# Patient Record
Sex: MALE | Race: BLACK OR AFRICAN AMERICAN | NOT HISPANIC OR LATINO | Employment: UNEMPLOYED | URBAN - METROPOLITAN AREA
[De-identification: names, ages, dates, MRNs, and addresses within clinical notes are randomized per-mention and may not be internally consistent; named-entity substitution may affect disease eponyms.]

---

## 2017-06-26 ENCOUNTER — GENERIC CONVERSION - ENCOUNTER (OUTPATIENT)
Dept: OTHER | Facility: OTHER | Age: 57
End: 2017-06-26

## 2017-08-10 ENCOUNTER — ALLSCRIPTS OFFICE VISIT (OUTPATIENT)
Dept: OTHER | Facility: OTHER | Age: 57
End: 2017-08-10

## 2017-08-10 ENCOUNTER — HOSPITAL ENCOUNTER (OUTPATIENT)
Dept: RADIOLOGY | Facility: HOSPITAL | Age: 57
Discharge: HOME | End: 2017-08-10
Attending: ORTHOPAEDIC SURGERY
Payer: COMMERCIAL

## 2017-08-10 DIAGNOSIS — M25.511 PAIN IN RIGHT SHOULDER: ICD-10-CM

## 2017-08-10 PROCEDURE — 73030 X-RAY EXAM OF SHOULDER: CPT

## 2017-08-18 ENCOUNTER — GENERIC CONVERSION - ENCOUNTER (OUTPATIENT)
Dept: OTHER | Facility: OTHER | Age: 57
End: 2017-08-18

## 2017-08-18 ENCOUNTER — APPOINTMENT (OUTPATIENT)
Dept: PHYSICAL THERAPY | Facility: CLINIC | Age: 57
End: 2017-08-18
Payer: COMMERCIAL

## 2017-08-18 DIAGNOSIS — M25.511 PAIN IN RIGHT SHOULDER: ICD-10-CM

## 2017-08-18 PROCEDURE — 97012 MECHANICAL TRACTION THERAPY: CPT

## 2017-08-18 PROCEDURE — 97162 PT EVAL MOD COMPLEX 30 MIN: CPT

## 2017-08-18 PROCEDURE — 97110 THERAPEUTIC EXERCISES: CPT

## 2017-08-22 ENCOUNTER — APPOINTMENT (OUTPATIENT)
Dept: PHYSICAL THERAPY | Facility: CLINIC | Age: 57
End: 2017-08-22
Payer: COMMERCIAL

## 2017-08-25 ENCOUNTER — APPOINTMENT (OUTPATIENT)
Dept: PHYSICAL THERAPY | Facility: CLINIC | Age: 57
End: 2017-08-25
Payer: COMMERCIAL

## 2017-08-28 ENCOUNTER — APPOINTMENT (OUTPATIENT)
Dept: PHYSICAL THERAPY | Facility: CLINIC | Age: 57
End: 2017-08-28
Payer: COMMERCIAL

## 2017-08-29 ENCOUNTER — GENERIC CONVERSION - ENCOUNTER (OUTPATIENT)
Dept: OTHER | Facility: OTHER | Age: 57
End: 2017-08-29

## 2017-08-30 ENCOUNTER — APPOINTMENT (OUTPATIENT)
Dept: PHYSICAL THERAPY | Facility: CLINIC | Age: 57
End: 2017-08-30
Payer: COMMERCIAL

## 2018-01-09 NOTE — MISCELLANEOUS
Message   Recorded as Task   Date: 08/17/2016 07:36 AM, Created By: Leonel Santiago   Task Name: Follow Up   Assigned To: [facility] ,Team   Regarding Patient: Shannon Cruz, Status: Active   Comment:    Gail Foy - 17 Aug 2016 7:36 AM     TASK CREATED  Pt is S/P (R) HIP INTRA ARTICULAR STEROID INJ o n 8/9/16 by Dr Simi Pennington  No pain diary scanned in  No f/u scheduled   Marleen Otero - 17 Aug 2016 1:04 PM     TASK EDITED  1st attempt- left message on voice mail for followup after injection  [place] - 27 Aug 2016 9:02 AM     TASK EDITED   2nd attemptto call, no answer  LMOM for CB  [place] - 24 Aug 2016 9:03 AM     TASK EDITED    Please send a [facility] letter  letter sent      Active Problems    1  _ (780 6)   2  Abdominal pain, LLQ (left lower quadrant) (789 04) (R10 32)   3  Abdominal pain, RLQ (right lower quadrant) (789 03) (R10 31)   4  Acute bronchitis (466 0) (J20 9)   5  Acute pharyngitis (462) (J02 9)   6  Cervical disc disorder with radiculopathy, mid-cervical region (723 4) (M50 12)   7  Cervical radiculopathy (723 4) (M54 12)   8  Cough (786 2) (R05)   9  Degenerative lumbar disc (722 52) (M51 36)   10  Depression (311) (F32 9)   11  Dyspepsia (536 8) (K30)   12  Dysuria (788 1) (R30 0)   13  Encounter for screening colonoscopy (V76 51) (Z12 11)   14  Hematuria (599 70) (R31 9)   15  Herniated cervical disc (722 0) (M50 20)   16  Hip pain, right (719 45) (M25 551)   17  Denied: History of HIV positive, asymptomatic   18  Lumbar herniated disc (722 10) (M51 26)   19  Lumbar radiculopathy (724 4) (M54 16)   20  Multilevel degenerative disc disease (722 6) (M53 9)   21  Myofascial pain (729 1) (M79 1)   22  Restless legs syndrome (333 94) (G25 81)   23  Well adult on routine health check (V70 0) (Z00 00)    Current Meds   1  Citalopram Hydrobromide TABS; Therapy: (Recorded:16Apr2015) to Recorded   2  ClonazePAM 1 MG Oral Tablet;    Therapy: 02Apr2015 to (Segundo Mixon) Recorded   3  Hydrocodone-Acetaminophen CAPS; Therapy: (Recorded:85Cgd7187) to Recorded   4  KlonoPIN TABS (ClonazePAM); Therapy: (Recorded:16Apr2015) to Recorded   5  Lyrica 100 MG Oral Capsule; TAKE 1 CAPSULE TWICE DAILY; Therapy: 51QNP5302 to (Evaluate:02Oct2016); Last Rx:38Hua6466 Ordered   6  Naproxen TABS; Therapy: (Recorded:16Apr2015) to Recorded   7  Pantoprazole Sodium 40 MG Oral Tablet Delayed Release; TAKE 1 TABLET 30   MINUTES BEFORE BREAKFAST DAILY; Therapy: 97LTH9765 to (Evaluate:14Aao6219)  Requested for: 55YAV2707; Last   Rx:18Mar2016 Ordered   8  Simvastatin 40 MG Oral Tablet; Therapy: 20Apr2015 to (Evaluate:44Bao3041) Recorded   9  Tamsulosin HCl - 0 4 MG Oral Capsule; TAKE 1 CAPSULE BY MOUTH DAILY AT   BEDTIME; Therapy: 24AYX2262 to (Jac Farley)  Requested for: 75CSI3570; Last   SB:62ZLC7025 Ordered   10  TiZANidine HCl - 4 MG Oral Tablet; TAKE 1 TABLET AT BEDTIME; Therapy: 34DMW0982 to (Evaluate:25Mar2016)  Requested for: 12EAI6516; Last    Rx:25Jan2016 Ordered    Allergies    1   ASPIRIN    Signatures   Electronically signed by : Rosina Herrera, ; Aug 25 2016 11:59AM EST                       (Author)

## 2018-01-09 NOTE — MISCELLANEOUS
Message   Recorded as Task   Date: 03/15/2016 07:34 AM, Created By: Alfredo Miranda   Task Name: Follow Up   Assigned To: [address] ,Team   Regarding Patient: Ethel Sorto, Status: Active   Comment:    Gail Foy - 15 Mar 2016 7:34 AM     TASK CREATED  Pt is S/P B/L L5 TFESI on 3/8/16 by Dr Alyse Denise  No f/u scheduled   Gail Foy - 15 Mar 2016 10:28 AM     TASK EDITED  1st attempt to reach pt  LM for return call  Gail Foy - 17 Mar 2016 12:42 PM     TASK EDITED  2nd attempt to reach pt  LM for return call  Gail Foy - 22 Mar 2016 10:00 AM     TASK EDITED  Please send a can't reach you letter  Can't reach you letter sent to patient  Active Problems    1  _ (780 6)   2  Abdominal pain, LLQ (left lower quadrant) (789 04) (R10 32)   3  Abdominal pain, RLQ (right lower quadrant) (789 03) (R10 31)   4  Acute bronchitis (466 0) (J20 9)   5  Acute pharyngitis (462) (J02 9)   6  Bilateral hip pain (719 45) (M25 551,M25 552)   7  Cervical disc disorder with radiculopathy, mid-cervical region (723 4) (M50 12)   8  Cervical radiculopathy (723 4) (M54 12)   9  Cough (786 2) (R05)   10  Degenerative lumbar disc (722 52) (M51 36)   11  Depression (311) (F32 9)   12  Dyspepsia (536 8) (K30)   13  Dysuria (788 1) (R30 0)   14  Encounter for screening colonoscopy (V76 51) (Z12 11)   15  Hematuria (599 70) (R31 9)   16  Herniated cervical disc (722 0) (M50 20)   17  Denied: History of HIV positive, asymptomatic   18  Lumbar herniated disc (722 10) (M51 26)   19  Lumbar radiculopathy (724 4) (M54 16)   20  Multilevel degenerative disc disease (722 6) (M53 9)   21  Myofascial pain (729 1) (M79 1)   22  Restless legs syndrome (333 94) (G25 81)   23  Well adult on routine health check (V70 0) (Z00 00)    Current Meds   1  Citalopram Hydrobromide TABS; Therapy: (Recorded:16Apr2015) to Recorded   2  ClonazePAM 1 MG Oral Tablet; Therapy: 02Apr2015 to ([phone]) Recorded   3   Flexeril TABS (Cyclobenzaprine HCl); Therapy: (Recorded:68Ori0362) to Recorded   4  Hydrocodone-Acetaminophen CAPS; Therapy: (Recorded:16Apr2015) to Recorded   5  KlonoPIN TABS (ClonazePAM); Therapy: (Recorded:69Awp6988) to Recorded   6  Naproxen TABS; Therapy: (Recorded:16Apr2015) to Recorded   7  Pantoprazole Sodium 40 MG Oral Tablet Delayed Release; TAKE 1 TABLET 30   MINUTES BEFORE BREAKFAST DAILY; Therapy: 91JIN3585 to (Evaluate:24Mdq4926)  Requested for: 87HIW0669; Last   Rx:18Mar2016 Ordered   8  Simvastatin 40 MG Oral Tablet; Therapy: 20Apr2015 to (Evaluate:36Qms7255) Recorded   9  Tamsulosin HCl - 0 4 MG Oral Capsule; TAKE 1 CAPSULE Bedtime; Therapy: 77CRF2858 to (Last Rx:09Nov2015)  Requested for: 10LDS6070 Ordered   10  TiZANidine HCl - 4 MG Oral Tablet; TAKE 1 TABLET AT BEDTIME; Therapy: 24TOH4714 to (Evaluate:25Mar2016)  Requested for: 70FHT0511; Last    Rx:25Jan2016 Ordered    Allergies    1   ASPIRIN    Signatures   Electronically signed by : Herson Kohli, ; Mar 24 2016 11:21AM EST                       (Author)

## 2018-01-12 VITALS — DIASTOLIC BLOOD PRESSURE: 77 MMHG | WEIGHT: 162 LBS | SYSTOLIC BLOOD PRESSURE: 116 MMHG | HEART RATE: 73 BPM

## 2018-01-14 NOTE — MISCELLANEOUS
Message   Recorded as Task   Date: 01/28/2016 07:34 AM, Created By: [facility]   Task Name: Med Renewal Request   Assigned To: [facility],Team   Regarding Patient: Minoo Contreras, Status: Active   Comment:    Celena Means - 28 Jan 2016 7:34 AM     TASK CREATED  can you please contact patient and let him know his hip xray was normal  thanks   Gail Foy - 29 Jan 2016 3:42 PM     TASK EDITED  Spoke with pt and made him aware of normal results of hip xray  Active Problems    1  _ (780 6)   2  Abdominal pain, LLQ (left lower quadrant) (789 04) (R10 32)   3  Abdominal pain, RLQ (right lower quadrant) (789 03) (R10 31)   4  Acute bronchitis (466 0) (J20 9)   5  Acute pharyngitis (462) (J02 9)   6  Bilateral hip pain (719 45) (M25 551,M25 552)   7  Cervical radiculopathy (723 4) (M54 12)   8  Cough (786 2) (R05)   9  Degenerative lumbar disc (722 52) (M51 36)   10  Depression (311) (F32 9)   11  Dyspepsia (536 8) (K30)   12  Dysuria (788 1) (R30 0)   13  Encounter for screening colonoscopy (V76 51) (Z12 11)   14  Hematuria (599 70) (R31 9)   15  Herniated cervical disc (722 0) (M50 20)   16  Denied: History of HIV positive, asymptomatic   17  Lumbar herniated disc (722 10) (M51 26)   18  Lumbar radiculopathy (724 4) (M54 16)   19  Multilevel degenerative disc disease (722 6) (M53 9)   20  Myofascial pain (729 1) (M79 1)   21  Restless legs syndrome (333 94) (G25 81)   22  Well adult on routine health check (V70 0) (Z00 00)    Current Meds   1  Citalopram Hydrobromide TABS; Therapy: (Recorded:03Cgt4407) to Recorded   2  ClonazePAM 1 MG Oral Tablet; Therapy: 57Seh3599 to (Jose Angel Marino) Recorded   3  Flexeril TABS (Cyclobenzaprine HCl); Therapy: (Recorded:16Apr2015) to Recorded   4  Hydrocodone-Acetaminophen CAPS; Therapy: (Recorded:16Apr2015) to Recorded   5  KlonoPIN TABS (ClonazePAM); Therapy: (Recorded:16Apr2015) to Recorded   6  Naproxen TABS;    Therapy: (Recorded:43Pau4087) to Recorded   7  Pantoprazole Sodium 40 MG Oral Tablet Delayed Release; TAKE 1 TABLET 30   MINUTES BEFORE BREAKFAST DAILY; Therapy: 05KRA9722 to ([place])  Requested for: 81NRW1924; Last   Rx:51Ivh8913 Ordered   8  Simvastatin 40 MG Oral Tablet; Therapy: 20Apr2015 to (Evaluate:35Uwk8044) Recorded   9  Tamsulosin HCl - 0 4 MG Oral Capsule; TAKE 1 CAPSULE Bedtime; Therapy: 89TKG1123 to (Last Rx:09Nov2015)  Requested for: 38LIF1017 Ordered   10  TiZANidine HCl - 4 MG Oral Tablet; TAKE 1 TABLET AT BEDTIME; Therapy: 93BBI8210 to (Evaluate:25Mar2016)  Requested for: 51FIQ4181; Last    Rx:25Jan2016 Ordered    Allergies    1   ASPIRIN    Signatures   Electronically signed by : Lang Schirmer, ; Feb 1 2016  9:06AM EST                       (Author)

## 2018-01-16 NOTE — RESULT NOTES
Message   Recorded as Task   Date: 02/16/2016 08:02 AM, Created By: Blanche Simms   Task Name: Follow Up   Assigned To: [address]   Regarding Patient: Annabelle Kirkpatrick, Status: Active   Comment:    Gail Foy - 16 Feb 2016 8:02 AM     TASK CREATED  Pt is S/P B/L L4 TFESI on 2/9/16 by Dr Grier Falling at [facility]  No f/u scheduled   Bg Little - 16 Feb 2016 4:59 PM     TASK EDITED  [facility] advisinf pt to call back   Claudia Saldaña - 17 Feb 2016 11:38 AM     TASK EDITED  Received VM from pt  on [facility] triage line from 1012 am  Pt  states that he had a procedure w/ Dr Grier Falling last week and received a f/u call yesterday  Pt  states that since inj  his pain is the same and constant  Pt  states that he has not been feeling very well  Pt  requesting c/b at [phone] to discuss  Ni Amador - 17 Feb 2016 12:05 PM     TASK REASSIGNED: Previously Assigned To [place] Espinal - 17 Feb 2016 3:54 PM     TASK EDITED  Attempt to reach pt  LM for return call  Gail Foy - 18 Feb 2016 3:15 PM     TASK EDITED  Spoke with pt who reports the first few days he felt better but on the 5th day the pain returned and has not gotten any better  Pain located in B/L LE, worse on L  Current level of pain 5/10, prior to inj 6-7/10  Pt was advised that there is still time for the steroid to work, that he should try using heat/ice and tylenol/ibuprofen for additional relief and that I would f/u with him again next week  Nguyễn Borja - 18 Feb 2016 4:05 PM     TASK REPLIED TO: Previously Assigned To Nguyễn Borja md aware   give steroid more time   Gail Foy - 18 Feb 2016 4:15 PM     TASK IN PROGRESS   Claudia Saldaña - 19 Feb 2016 9:07 AM     TASK EDITED  Received VM from pt  on [facility] triage line from 9 am  Pt  states that he is returning a call  Pt  requesting c/b at [phone]  Gail Foy - 23 Feb 2016 9:10 AM     TASK EDITED  attempt to reach pt for f/u   LM asking for return call  Gail Foy - 24 Feb 2016 3:29 PM     TASK EDITED  Spoke with pt who received 60% relief from injection  Current level of pain 3-4/10, prior to inj 7/10  Pt reports pain comes and goes and is requesting a second inj to offer additional relief  Monroe Cartwright - 25 Feb 2016 9:33 AM     TASK REPLIED TO: Previously Assigned To Monroe Cartwright md aware    please schedule   Gail Foy - 25 Feb 2016 9:42 AM     TASK REASSIGNED: Previously Assigned To [facility] Tiesha Gandhi - 26 Feb 2016 10:21 AM     TASK REASSIGNED: Previously Assigned To [facility] surgery sched,Team   Gail Foy - 26 Feb 2016 11:08 AM     TASK EDITED  Pt LM asking for a return call to schedule procedure  Pt can be reached at [phone]     Tiesha Arechiga - 29 Feb 2016 9:10 AM     TASK REASSIGNED: Previously Assigned To [facility] surgery sched,Tiesha Villatoro - 01 Mar 2016 1:45 PM     TASK EDITED  S/W patient - patient scheduled for Tuesday, March 8 at [facility] with Dr Marti Milner - patient advised nothing to eat or drink 1 hour prior to procedure but encouraged to have a light breakfast - patient denies any blood thinners/abx's - patient also advised to arrange for  - patient aware and agreed        Signatures   Electronically signed by : Wayne Sharpe, ; Mar  1 2016  1:46PM EST                       (Author)

## 2018-08-17 ENCOUNTER — APPOINTMENT (OUTPATIENT)
Dept: LAB | Facility: CLINIC | Age: 58
End: 2018-08-17
Payer: MEDICARE

## 2018-08-17 ENCOUNTER — TRANSCRIBE ORDERS (OUTPATIENT)
Dept: LAB | Facility: CLINIC | Age: 58
End: 2018-08-17

## 2018-08-17 DIAGNOSIS — E86.0 DEHYDRATION: ICD-10-CM

## 2018-08-17 DIAGNOSIS — R53.81 DEBILITY: ICD-10-CM

## 2018-08-17 DIAGNOSIS — R10.9 STOMACH ACHE: Primary | ICD-10-CM

## 2018-08-17 DIAGNOSIS — E55.9 AVITAMINOSIS D: ICD-10-CM

## 2018-08-17 DIAGNOSIS — E78.5 HYPERLIPIDEMIA, UNSPECIFIED HYPERLIPIDEMIA TYPE: ICD-10-CM

## 2018-08-17 DIAGNOSIS — K52.9 INFLAMMATORY BOWEL DISEASE: ICD-10-CM

## 2018-08-17 DIAGNOSIS — R10.9 STOMACH ACHE: ICD-10-CM

## 2018-08-17 DIAGNOSIS — D51.8 OTHER VITAMIN B12 DEFICIENCY ANEMIA: ICD-10-CM

## 2018-08-17 DIAGNOSIS — Z79.899 NEED FOR PROPHYLACTIC CHEMOTHERAPY: ICD-10-CM

## 2018-08-17 DIAGNOSIS — Z57.8 EMPLOYEE EXPOSURE TO BLOOD: ICD-10-CM

## 2018-08-17 LAB
25(OH)D3 SERPL-MCNC: 28.6 NG/ML (ref 30–100)
ALBUMIN SERPL BCP-MCNC: 3.9 G/DL (ref 3.5–5)
ALP SERPL-CCNC: 83 U/L (ref 46–116)
ALT SERPL W P-5'-P-CCNC: 30 U/L (ref 12–78)
ANION GAP SERPL CALCULATED.3IONS-SCNC: 3 MMOL/L (ref 4–13)
AST SERPL W P-5'-P-CCNC: 18 U/L (ref 5–45)
BASOPHILS # BLD AUTO: 0.02 THOUSANDS/ΜL (ref 0–0.1)
BASOPHILS NFR BLD AUTO: 0 % (ref 0–1)
BILIRUB SERPL-MCNC: 0.5 MG/DL (ref 0.2–1)
BUN SERPL-MCNC: 9 MG/DL (ref 5–25)
CALCIUM SERPL-MCNC: 9.5 MG/DL (ref 8.3–10.1)
CHLORIDE SERPL-SCNC: 105 MMOL/L (ref 100–108)
CHOLEST SERPL-MCNC: 203 MG/DL (ref 50–200)
CO2 SERPL-SCNC: 31 MMOL/L (ref 21–32)
CREAT SERPL-MCNC: 1.03 MG/DL (ref 0.6–1.3)
EOSINOPHIL # BLD AUTO: 0.13 THOUSAND/ΜL (ref 0–0.61)
EOSINOPHIL NFR BLD AUTO: 3 % (ref 0–6)
ERYTHROCYTE [DISTWIDTH] IN BLOOD BY AUTOMATED COUNT: 13.2 % (ref 11.6–15.1)
GFR SERPL CREATININE-BSD FRML MDRD: 93 ML/MIN/1.73SQ M
GLUCOSE P FAST SERPL-MCNC: 98 MG/DL (ref 65–99)
HCT VFR BLD AUTO: 49 % (ref 36.5–49.3)
HDLC SERPL-MCNC: 50 MG/DL (ref 40–60)
HGB BLD-MCNC: 16.4 G/DL (ref 12–17)
IMM GRANULOCYTES # BLD AUTO: 0.01 THOUSAND/UL (ref 0–0.2)
IMM GRANULOCYTES NFR BLD AUTO: 0 % (ref 0–2)
LDLC SERPL CALC-MCNC: 136 MG/DL (ref 0–100)
LYMPHOCYTES # BLD AUTO: 2.34 THOUSANDS/ΜL (ref 0.6–4.47)
LYMPHOCYTES NFR BLD AUTO: 45 % (ref 14–44)
MCH RBC QN AUTO: 31 PG (ref 26.8–34.3)
MCHC RBC AUTO-ENTMCNC: 33.5 G/DL (ref 31.4–37.4)
MCV RBC AUTO: 93 FL (ref 82–98)
MONOCYTES # BLD AUTO: 0.39 THOUSAND/ΜL (ref 0.17–1.22)
MONOCYTES NFR BLD AUTO: 8 % (ref 4–12)
NEUTROPHILS # BLD AUTO: 2.27 THOUSANDS/ΜL (ref 1.85–7.62)
NEUTS SEG NFR BLD AUTO: 44 % (ref 43–75)
NONHDLC SERPL-MCNC: 153 MG/DL
NRBC BLD AUTO-RTO: 0 /100 WBCS
PLATELET # BLD AUTO: 248 THOUSANDS/UL (ref 149–390)
PMV BLD AUTO: 9.8 FL (ref 8.9–12.7)
POTASSIUM SERPL-SCNC: 4.1 MMOL/L (ref 3.5–5.3)
PROT SERPL-MCNC: 7.7 G/DL (ref 6.4–8.2)
RBC # BLD AUTO: 5.29 MILLION/UL (ref 3.88–5.62)
SODIUM SERPL-SCNC: 139 MMOL/L (ref 136–145)
T3FREE SERPL-MCNC: 2.72 PG/ML (ref 2.3–4.2)
T4 FREE SERPL-MCNC: 1.1 NG/DL (ref 0.76–1.46)
TRIGL SERPL-MCNC: 86 MG/DL
TSH SERPL DL<=0.05 MIU/L-ACNC: 1.68 UIU/ML (ref 0.36–3.74)
VIT B12 SERPL-MCNC: 839 PG/ML (ref 100–900)
WBC # BLD AUTO: 5.16 THOUSAND/UL (ref 4.31–10.16)

## 2018-08-17 PROCEDURE — 80061 LIPID PANEL: CPT

## 2018-08-17 PROCEDURE — 80074 ACUTE HEPATITIS PANEL: CPT

## 2018-08-17 PROCEDURE — 82306 VITAMIN D 25 HYDROXY: CPT

## 2018-08-17 PROCEDURE — 84443 ASSAY THYROID STIM HORMONE: CPT

## 2018-08-17 PROCEDURE — 82607 VITAMIN B-12: CPT

## 2018-08-17 PROCEDURE — 36415 COLL VENOUS BLD VENIPUNCTURE: CPT

## 2018-08-17 PROCEDURE — 84481 FREE ASSAY (FT-3): CPT

## 2018-08-17 PROCEDURE — 85025 COMPLETE CBC W/AUTO DIFF WBC: CPT

## 2018-08-17 PROCEDURE — 80053 COMPREHEN METABOLIC PANEL: CPT

## 2018-08-17 PROCEDURE — 84439 ASSAY OF FREE THYROXINE: CPT

## 2018-08-17 SDOH — HEALTH STABILITY - PHYSICAL HEALTH: OCCUPATIONAL EXPOSURE TO OTHER RISK FACTORS: Z57.8

## 2018-08-18 ENCOUNTER — APPOINTMENT (OUTPATIENT)
Dept: LAB | Facility: CLINIC | Age: 58
End: 2018-08-18
Payer: MEDICARE

## 2018-08-18 DIAGNOSIS — K52.9 INFLAMMATORY BOWEL DISEASE: ICD-10-CM

## 2018-08-18 DIAGNOSIS — E55.9 AVITAMINOSIS D: ICD-10-CM

## 2018-08-18 DIAGNOSIS — E78.5 HYPERLIPIDEMIA, UNSPECIFIED HYPERLIPIDEMIA TYPE: ICD-10-CM

## 2018-08-18 DIAGNOSIS — Z79.899 NEED FOR PROPHYLACTIC CHEMOTHERAPY: ICD-10-CM

## 2018-08-18 DIAGNOSIS — Z57.8 EMPLOYEE EXPOSURE TO BLOOD: ICD-10-CM

## 2018-08-18 DIAGNOSIS — R53.81 DEBILITY: ICD-10-CM

## 2018-08-18 DIAGNOSIS — R10.9 STOMACH ACHE: ICD-10-CM

## 2018-08-18 DIAGNOSIS — E86.0 DEHYDRATION: ICD-10-CM

## 2018-08-18 DIAGNOSIS — D51.8 OTHER VITAMIN B12 DEFICIENCY ANEMIA: ICD-10-CM

## 2018-08-18 LAB
HAV IGM SER QL: NORMAL
HBV CORE IGM SER QL: NORMAL
HBV SURFACE AG SER QL: NORMAL
HCV AB SER QL: NORMAL

## 2018-08-18 PROCEDURE — 87505 NFCT AGENT DETECTION GI: CPT

## 2018-08-18 PROCEDURE — 87209 SMEAR COMPLEX STAIN: CPT

## 2018-08-18 PROCEDURE — 87177 OVA AND PARASITES SMEARS: CPT

## 2018-08-18 PROCEDURE — 87338 HPYLORI STOOL AG IA: CPT

## 2018-08-18 SDOH — HEALTH STABILITY - PHYSICAL HEALTH: OCCUPATIONAL EXPOSURE TO OTHER RISK FACTORS: Z57.8

## 2018-08-19 LAB
CAMPYLOBACTER DNA SPEC NAA+PROBE: NORMAL
H PYLORI AG STL QL IA: NEGATIVE
SALMONELLA DNA SPEC QL NAA+PROBE: NORMAL
SHIGA TOXIN STX GENE SPEC NAA+PROBE: NORMAL
SHIGELLA DNA SPEC QL NAA+PROBE: NORMAL

## 2018-08-22 LAB — O+P STL CONC: NORMAL

## 2019-03-15 ENCOUNTER — OFFICE VISIT (OUTPATIENT)
Dept: PAIN MEDICINE | Facility: CLINIC | Age: 59
End: 2019-03-15
Payer: COMMERCIAL

## 2019-03-15 ENCOUNTER — TELEPHONE (OUTPATIENT)
Dept: OBGYN CLINIC | Facility: HOSPITAL | Age: 59
End: 2019-03-15

## 2019-03-15 VITALS
DIASTOLIC BLOOD PRESSURE: 86 MMHG | RESPIRATION RATE: 18 BRPM | HEART RATE: 76 BPM | HEIGHT: 73 IN | WEIGHT: 153 LBS | BODY MASS INDEX: 20.28 KG/M2 | SYSTOLIC BLOOD PRESSURE: 122 MMHG

## 2019-03-15 DIAGNOSIS — M51.26 LUMBAR DISC HERNIATION: Primary | ICD-10-CM

## 2019-03-15 DIAGNOSIS — M48.02 CERVICAL SPINAL STENOSIS: ICD-10-CM

## 2019-03-15 DIAGNOSIS — M54.16 LUMBAR RADICULOPATHY: ICD-10-CM

## 2019-03-15 DIAGNOSIS — M48.061 SPINAL STENOSIS OF LUMBAR REGION, UNSPECIFIED WHETHER NEUROGENIC CLAUDICATION PRESENT: ICD-10-CM

## 2019-03-15 DIAGNOSIS — M47.22 OSTEOARTHRITIS OF SPINE WITH RADICULOPATHY, CERVICAL REGION: ICD-10-CM

## 2019-03-15 PROCEDURE — 99214 OFFICE O/P EST MOD 30 MIN: CPT | Performed by: NURSE PRACTITIONER

## 2019-03-15 RX ORDER — PANTOPRAZOLE SODIUM 40 MG/1
1 TABLET, DELAYED RELEASE ORAL
COMMUNITY
Start: 2015-05-11

## 2019-03-15 RX ORDER — GABAPENTIN 300 MG/1
100 CAPSULE ORAL 3 TIMES DAILY
COMMUNITY

## 2019-03-15 NOTE — TELEPHONE ENCOUNTER
Caller: patient  Call back number: [phone]    Patient called stating his MRI needs authorization

## 2019-03-15 NOTE — PROGRESS NOTES
Pt c/o neck that radiates to the left shoulder and arm and back pain that radiates to the right hip and leg    Assessment:  1  Lumbar disc herniation    2  Lumbar radiculopathy    3  Cervical spinal stenosis    4  Osteoarthritis of spine with radiculopathy, cervical region    5  Spinal stenosis of lumbar region, unspecified whether neurogenic claudication present        Plan:  Josiah Mendoza is a 62 y o  male with a history of cervical stenosis, lumbar herniated disc, lumbar radiculopathy and right shoulder pain  The patient presents today with neck and low back pain which has worsened since last office visit  The patient was last seen office on 8/3/2016 and was noted to have mild cervical and mild to moderate lumbar stenosis at that time  He reports that his pain and symptoms have worsened since his last office visit  His last cervical and lumbar MRIs were completed in 2015  Therefore, at this time I have ordered the patient an updated MRI of his cervical and lumbar spine for further evaluation  The patient was instructed that our office will call with results of the studies once they are completed  I will also refer the patient to physical therapy for his neck and low back in the interim to help with his pain and symptoms  He was given a referral this office visit  The patient will follow up after the completion of his cervical and lumbar MRIs or sooner with the worsening of symptoms  My impressions and treatment recommendations were discussed in detail with the patient who verbalized understanding and had no further questions  Discharge instructions were provided  I personally saw and examined the patient and I agree with the above discussed plan of care  Orders Placed This Encounter   Procedures    MRI lumbar spine wo contrast     Standing Status:   Future     Standing Expiration Date:   3/15/2023     Scheduling Instructions:       There is no preparation for this test  Please leave your jewelry and valuables at home, wedding rings are the exception  Please bring your insurance cards, a form of photo ID and a list of your medications with you  Arrive 15 minutes prior to your appointment time in order to register  Please bring any prior CT or MRI studies of this area that were not performed at a [facility]  To schedule this appointment, please contact Central Scheduling at [phone]  Order Specific Question:   What is the patient's sedation requirement? Answer:   No Sedation    MRI cervical spine wo contrast     Standing Status:   Future     Standing Expiration Date:   3/15/2023     Scheduling Instructions: There is no preparation for this test  Please leave your jewelry and valuables at home, wedding rings are the exception  Please bring your insurance cards, a form of photo ID and a list of your medications with you  Arrive 15 minutes prior to your appointment time in order to register  Please bring any prior CT or MRI studies of this area that were not performed at a [facility]  To schedule this appointment, please contact Central Scheduling at [phone]  Order Specific Question:   What is the patient's sedation requirement?      Answer:   No Sedation    Ambulatory referral to Physical Therapy     Standing Status:   Future     Standing Expiration Date:   9/15/2019     Referral Priority:   Routine     Referral Type:   Physical Therapy     Referral Reason:   Specialty Services Required     Requested Specialty:   Physical Therapy     Number of Visits Requested:   1     Expiration Date:   3/15/2020     New Medications Ordered This Visit   Medications    pantoprazole (PROTONIX) 40 mg tablet     Sig: Take 1 tablet by mouth    gabapentin (NEURONTIN) 300 mg capsule     Sig: Take 100 mg by mouth 3 (three) times a day       History of Present Illness:  Otis Turcios is a 62 y o  male with a history of cervical stenosis, lumbar herniated disc, lumbar radiculopathy and right shoulder pain  The patient was last seen office on 8/3/2016 where he was started on Lyrica 100 mg 1 capsule 2 times daily  He presents for a follow up office visit in regards to Back Pain (radiates to the right hip and leg); Hip Pain; Leg Pain; Neck Pain (radiates to the left shoulder and arm); Shoulder Pain; and Arm Pain  The patients current symptoms include low back and neck pain  He reports that the neck pain starts in his neck and radiates into his upper back and down his left arm into his hand  He also complains of low back pain that radiates into his right hip and down the lateral and anterior aspect of his right leg to his ankle  He denies bilateral leg/arm weakness or bowel or bladder issues  He describes his pain as burning, dull aching, cramping, numbness, pins and needles pain he reports that his pain is both constant and intermittent nature  The patient reports that his pain and symptoms have worsened since last office visit  I have personally reviewed and/or updated the patient's past medical history, past surgical history, family history, social history, current medications, allergies, and vital signs today  Review of Systems   Respiratory: Negative for shortness of breath  Cardiovascular: Negative for chest pain  Gastrointestinal: Negative for constipation, diarrhea, nausea and vomiting  Musculoskeletal: Positive for gait problem  Negative for arthralgias, joint swelling and myalgias  Decreased Rom  Joint stiffness   Skin: Negative for rash  Neurological: Positive for dizziness and weakness  Negative for seizures  Memory loss   All other systems reviewed and are negative  There is no problem list on file for this patient  Past Medical History:   Diagnosis Date    Hyperlipidemia        No past surgical history on file  No family history on file      Social History     Occupational History    Not on file Tobacco Use    Smoking status: Former Smoker   Substance and Sexual Activity    Alcohol use: No    Drug use: No    Sexual activity: Not on file       Current Outpatient Medications on File Prior to Visit   Medication Sig    gabapentin (NEURONTIN) 300 mg capsule Take 100 mg by mouth 3 (three) times a day    naproxen (NAPROSYN) 500 mg tablet Take 500 mg by mouth 2 (two) times a day with meals    pantoprazole (PROTONIX) 40 mg tablet Take 1 tablet by mouth    simvastatin (ZOCOR) 20 mg tablet Take 20 mg by mouth daily at bedtime     No current facility-administered medications on file prior to visit  Allergies   Allergen Reactions    Aspirin        Physical Exam:    /86 (BP Location: Right arm, Patient Position: Sitting, Cuff Size: Standard)   Pulse 76   Resp 18   Ht 6' 1" (1 854 m)   Wt 69 4 kg (153 lb)   BMI 20 19 kg/m²     Constitutional:normal, well developed, well nourished, alert, in no distress and non-toxic and no overt pain behavior    Eyes:anicteric  HEENT:grossly intact  Neck:supple, symmetric, trachea midline and no masses   Pulmonary:even and unlabored  Cardiovascular:No edema or pitting edema present  Skin:Normal without rashes or lesions and well hydrated  Psychiatric:Mood and affect appropriate  Neurologic:Cranial Nerves II-XII grossly intact  Musculoskeletal:normal     Cervical Spine Exam    Appearance:  Normal lordosis  Palpation/Tenderness:  left cervical paraspinal tenderness  right cervical paraspinal tenderness  left trapezium tenderness  right trapezium tenderness  Sensory:  no sensory deficits noted  Range of Motion:  Flexion:  Minimally limited  with pain  Extension:  Minimally limited  with pain  Lateral Flexion - Left:  Minimally limited  with pain  Lateral Flexion - Right:  Minimally limited  with pain  Rotation - Left:  Minimally limited  with pain  Rotation - Right:  Minimally limited  with pain  Motor Strength:  Left Arm Flexion  5/5  Left Arm Extension 5/5  Right Arm Flexion  5/5  Right Arm Extension  5/5  Left Wrist Flexion  5/5  Left Wrist Extension  5/5  Left Finger Abduction  5/5  Right Finger Abduction  5/5  Left    5/5  Right   5/5  Special Tests:  Left Spurlings:  negative  Right Spurlings  negative     Lumbar Spine Exam    Appearance:  Normal lordosis  Palpation/Tenderness:  left lumbar paraspinal tenderness  right lumbar paraspinal tenderness  Sensory:  no sensory deficits noted  Range of Motion:  Flexion:  Minimally limited  with pain  Extension:  Minimally limited  with pain  Lateral Flexion - Left:  Minimally limited  with pain  Lateral Flexion - Right:  Minimally limited  with pain  Rotation - Left:  Minimally limited  with pain  Rotation - Right:  Minimally limited  with pain  Motor Strength:  Left hip flexion:  5/5  Right hip flexion:  5/5  Left knee extension:  5/5  Right knee extension:  5/5  Left foot dorsiflexion:  5/5  Left foot plantar flexion:  5/5  Right foot dorsiflexion:  5/5  Right foot plantar flexion:  5/5        Imaging    MRI:  10/16/15 Lumbar MRI: L1-L2- Normal      L2-L3- Central annular fissure with minimal annular bulging  No disc  herniation, canal stenosis or foraminal narrowing      L3-L4- Mild diffuse annular bulging  There is a moderate focal  broad-based left foraminal and lateral disc protrusion abutting and  displacing the exiting nerve  Mild to moderate canal stenosis  Moderate left and mild right foraminal narrowing      L4-L5- Mild diffuse annular bulging  Annular fissure within the  central aspect of the disc  Mild facet arthropathy  Mild to moderate  canal stenosis without significant foraminal narrowing      L5-S1- Disc desiccation and loss of disc height  Mild diffuse annular  bulging  Mild facet degenerative change  No canal stenosis   No  significant foraminal narrowing      IMPRESSION-     Annular bulging with broad-based left foraminal and lateral disc  protrusion at L3-4 with moderate left foraminal narrowing  Correlate  for left L3 radiculopathy  Mild to moderate canal stenosis at this  level      Annular bulging at multiple levels with associated annular fissures  Degenerative disc disease described at each level above            10/16/15 Cervical MRI: C2-C3- No canal stenosis or foraminal narrowing      C3-C4- Uncinate joint hypertrophic degenerative change bilaterally  Mild canal stenosis  Moderate bilateral foraminal narrowing      C4-C5- No canal stenosis or significant foraminal narrowing      C5-C6- Mild uncinate joint hypertrophic degenerative change  No canal  stenosis  Mild bilateral foraminal narrowing      C6-C7- Mild annular bulging  No canal stenosis  Mild bilateral  foraminal narrowing      C7-T1- Left greater than right uncinate joint hypertrophic  degenerative change with disc osteophyte complex  Moderately severe  left foraminal narrowing      UPPER THORACIC DISC SPACES- Mild upper thoracic degenerative disc  disease without canal stenosis or foraminal narrowing      IMPRESSION-     Multilevel cervical spondylitic degenerative change with annular  bulging, endplate and uncinate hypertrophic change  Disc osteophyte  complex on the left at C7-T1 resulting in moderately severe foraminal  narrowing   Moderate bilateral foraminal narrowing at C3-4      No cord compression or abnormal cord signal

## 2019-03-18 NOTE — TELEPHONE ENCOUNTER
I sent message to Azalea Berger who does [facility], she is aware the MRI needs Rina Ellsworth and is working on it  Left vm for pt to c/b  C/B # and [place] provided  Pls make pt aware Rina Ellsworth is being worked on

## 2019-03-25 ENCOUNTER — OFFICE VISIT (OUTPATIENT)
Dept: PAIN MEDICINE | Facility: CLINIC | Age: 59
End: 2019-03-25
Payer: COMMERCIAL

## 2019-03-25 VITALS
HEIGHT: 73 IN | WEIGHT: 153 LBS | BODY MASS INDEX: 20.28 KG/M2 | SYSTOLIC BLOOD PRESSURE: 122 MMHG | HEART RATE: 73 BPM | RESPIRATION RATE: 18 BRPM | DIASTOLIC BLOOD PRESSURE: 78 MMHG

## 2019-03-25 DIAGNOSIS — M48.061 SPINAL STENOSIS OF LUMBAR REGION, UNSPECIFIED WHETHER NEUROGENIC CLAUDICATION PRESENT: ICD-10-CM

## 2019-03-25 DIAGNOSIS — M51.26 LUMBAR DISC HERNIATION: ICD-10-CM

## 2019-03-25 DIAGNOSIS — G89.4 CHRONIC PAIN SYNDROME: Primary | ICD-10-CM

## 2019-03-25 DIAGNOSIS — M48.02 CERVICAL SPINAL STENOSIS: ICD-10-CM

## 2019-03-25 DIAGNOSIS — M47.22 OSTEOARTHRITIS OF SPINE WITH RADICULOPATHY, CERVICAL REGION: ICD-10-CM

## 2019-03-25 DIAGNOSIS — M54.16 LUMBAR RADICULOPATHY: ICD-10-CM

## 2019-03-25 DIAGNOSIS — M50.120 CERVICAL DISC DISORDER WITH RADICULOPATHY OF MID-CERVICAL REGION: ICD-10-CM

## 2019-03-25 PROCEDURE — 99214 OFFICE O/P EST MOD 30 MIN: CPT | Performed by: NURSE PRACTITIONER

## 2019-03-25 NOTE — PROGRESS NOTES
Pt c/o back pain that radiates to the hips and legs and neck pain that radiates to the left shoulder and and arm    Assessment:  1  Chronic pain syndrome    2  Cervical disc disorder with radiculopathy of mid-cervical region    3  Lumbar disc herniation    4  Lumbar radiculopathy    5  Spinal stenosis of lumbar region, unspecified whether neurogenic claudication present    6  Cervical spinal stenosis    7  Osteoarthritis of spine with radiculopathy, cervical region        Plan:  Sunil Yuen is a 62 y o  male with a history of chronic pain secondary to lumbar disc herniation, lumbar radiculopathy, lumbar stenosis, cervical spinal stenosis, cervical spondylosis  The patient presents today with ongoing neck and low back pain  He reported that his neck pain has worsened since last office visit  He reports that his back pain has remained the same  He recently underwent a cervical and lumbar MRI, since last office visit  The patient had these MRIs completed out of network at [facility] his cervical MRI was noted to have multilevel disc protrusions with left-sided disc protrusions noted at C3-C4, C4-C5 and C5-C6  I discussed with the patient about proceeding with a cervical epidural steroid injection to decrease inflammation within his cervical spine to decrease his pain and radicular symptoms  The patient was educated on the procedures most common risks, and was given a brochure the procedure  He verbalized understanding, would like to proceed with the procedure  The patient is requesting to have this procedure completed prior to leaving to [place] on 04/04/2019 to visit his mother who is ill  I also reviewed with the patient his lumbar MRI where he was noted to have multiple disc protrusions with stenosis noted at L3-L4 and L4-L5  The patient reports that his back is asymptomatic at this time   I discussed with the patient about options for his low back such as returning to Dr Haydee Durand is office to discuss surgical options If he would develop any weakness in the legs or severe back pain  However,He reports that his neck pain is the most severe pain he is experiencing and would like to focus on his neck at this time  However he was educated on red flag signs of worsening low back pain, such as saddle paresthesias, incontinence of bowel or bladder, increased weakness of the bilateral legs, increased low back pain, urinary retention  He was instructed that he should report to the ER with any of these sign and symptoms  He verbalized an understanding  The patient did not bring in his cervical or lumbar MRI discs for review  I discussed with the patient that we would need his discs to be uploaded in the system so Yajaira Hoskins can perform his cervical epidural steroid injection  The patient verbalized an understanding and stated that he would bring in his discs to be uploaded into the system  Complete risks and benefits including bleeding, infection, tissue reaction, nerve injury and allergic reaction were discussed  The approach was demonstrated using models and literature was provided  The patient will follow up after he returns from [place] or sooner with the worsening of symptoms  My impressions and treatment recommendations were discussed in detail with the patient who verbalized understanding and had no further questions  Discharge instructions were provided  I personally saw and examined the patient and I agree with the above discussed plan of care  Orders Placed This Encounter   Procedures    [place] spine and pain procedure     Standing Status:   Future     Standing Expiration Date:   3/25/2023     Order Specific Question:   Reason for Exam:     Answer:   CHACORTA     Order Specific Question:   Anticoagulant hold needed? Answer:   No     No orders of the defined types were placed in this encounter        History of Present Illness:  Sunil Yuen is a 62 y o  male with a history of chronic pain secondary to lumbar disc herniation, lumbar radiculopathy, lumbar stenosis, cervical spinal stenosis, cervical spondylosis  The patient was last seen office on 3/15/2019 where he was ordered an updated MRI of his lumbar and cervical spine for further evaluation  He presents for a follow up office visit in regards to Back Pain (radiates to the hips and leg); Hip Pain; Leg Pain; Neck Pain (radiates to the left shoulder and arm); Shoulder Pain; and Arm Pain  The patients current symptoms include ongoing neck and low back pain  The patient reports that his neck pain is currently the most severe pain is experience  Reports that his starts in the left side of his neck and radiates into his left shoulder and down his left arm causing numbness and tingling in his hand  He denies bilateral arm weakness, or bowel or bladder issues  He also complains of ongoing low back pain that radiates into his right hip and into the anterior aspect of his right calf  He describes his pain as burning, sharp, shooting, numbness, pins and needles pain that is constant nature with symptoms worsening during the morning hours  The patient reports that his pain is symptoms have worsened since last office visit  He currently rates his pain 8/10 numeric pain scale  I have personally reviewed and/or updated the patient's past medical history, past surgical history, family history, social history, current medications, allergies, and vital signs today  Review of Systems   Respiratory: Negative for shortness of breath  Cardiovascular: Negative for chest pain  Gastrointestinal: Negative for constipation, diarrhea, nausea and vomiting  Musculoskeletal: Positive for gait problem  Negative for arthralgias, joint swelling and myalgias  Decreased ROM  Joint stiffness   Skin: Negative for rash  Neurological: Positive for dizziness  Negative for seizures and weakness  All other systems reviewed and are negative        There is no problem list on file for this patient  Past Medical History:   Diagnosis Date    Hyperlipidemia        No past surgical history on file  No family history on file  Social History     Occupational History    Not on file   Tobacco Use    Smoking status: Former Smoker   Substance and Sexual Activity    Alcohol use: No    Drug use: No    Sexual activity: Not on file       Current Outpatient Medications on File Prior to Visit   Medication Sig    gabapentin (NEURONTIN) 300 mg capsule Take 100 mg by mouth 3 (three) times a day    naproxen (NAPROSYN) 500 mg tablet Take 500 mg by mouth 2 (two) times a day with meals    pantoprazole (PROTONIX) 40 mg tablet Take 1 tablet by mouth    simvastatin (ZOCOR) 20 mg tablet Take 20 mg by mouth daily at bedtime     No current facility-administered medications on file prior to visit  Allergies   Allergen Reactions    Aspirin        Physical Exam:    /78 (BP Location: Right arm, Patient Position: Sitting, Cuff Size: Standard)   Pulse 73   Resp 18   Ht 6' 1" (1 854 m)   Wt 69 4 kg (153 lb)   BMI 20 19 kg/m²     Constitutional:normal, well developed, well nourished, alert, in no distress and non-toxic and no overt pain behavior    Eyes:anicteric  HEENT:grossly intact  Neck:supple, symmetric, trachea midline and no masses   Pulmonary:even and unlabored  Cardiovascular:No edema or pitting edema present  Skin:Normal without rashes or lesions and well hydrated  Psychiatric:Mood and affect appropriate  Neurologic:Cranial Nerves II-XII grossly intact  Musculoskeletal:normal     Cervical Spine Exam    Appearance:  Normal lordosis  Palpation/Tenderness:  left cervical paraspinal tenderness  left trapezium tenderness  Sensory:  no sensory deficits noted  Range of Motion:  Flexion:  Minimally limited  with pain  Extension:  Minimally limited  with pain  Lateral Flexion - Left:  Minimally limited  with pain  Lateral Flexion - Right:  Minimally limited  with pain  Rotation - Left:  Minimally limited  with pain  Rotation - Right:  Minimally limited  with pain  Motor Strength:  Left Arm Flexion  5/5  Left Arm Extension  5/5  Right Arm Flexion  5/5  Right Arm Extension  5/5  Left Wrist Flexion  5/5  Left Wrist Extension  5/5  Left Finger Abduction  5/5  Right Finger Abduction  5/5  Left    5/5  Right   5/5  Special Tests:  Left Spurlings:  positive  Right Spurlings  negative        Imaging

## 2019-04-04 ENCOUNTER — HOSPITAL ENCOUNTER (OUTPATIENT)
Dept: RADIOLOGY | Facility: CLINIC | Age: 59
Discharge: HOME | End: 2019-04-04
Attending: ANESTHESIOLOGY | Admitting: ANESTHESIOLOGY
Payer: COMMERCIAL

## 2019-04-04 VITALS
OXYGEN SATURATION: 99 % | TEMPERATURE: 98.4 F | DIASTOLIC BLOOD PRESSURE: 83 MMHG | RESPIRATION RATE: 20 BRPM | HEART RATE: 85 BPM | SYSTOLIC BLOOD PRESSURE: 118 MMHG

## 2019-04-04 DIAGNOSIS — M50.120 CERVICAL DISC DISORDER WITH RADICULOPATHY OF MID-CERVICAL REGION: ICD-10-CM

## 2019-04-04 PROCEDURE — 62321 NJX INTERLAMINAR CRV/THRC: CPT | Performed by: ANESTHESIOLOGY

## 2019-04-04 RX ORDER — LIDOCAINE HYDROCHLORIDE 10 MG/ML
5 INJECTION, SOLUTION EPIDURAL; INFILTRATION; INTRACAUDAL; PERINEURAL ONCE
Status: COMPLETED | OUTPATIENT
Start: 2019-04-04 | End: 2019-04-04

## 2019-04-04 RX ORDER — METHYLPREDNISOLONE ACETATE 80 MG/ML
80 INJECTION, SUSPENSION INTRA-ARTICULAR; INTRALESIONAL; INTRAMUSCULAR; PARENTERAL; SOFT TISSUE ONCE
Status: COMPLETED | OUTPATIENT
Start: 2019-04-04 | End: 2019-04-04

## 2019-04-04 RX ADMIN — IOHEXOL 1 ML: 300 INJECTION, SOLUTION INTRAVENOUS at 13:52

## 2019-04-04 RX ADMIN — LIDOCAINE HYDROCHLORIDE 4 ML: 10 INJECTION, SOLUTION EPIDURAL; INFILTRATION; INTRACAUDAL; PERINEURAL at 13:50

## 2019-04-04 RX ADMIN — METHYLPREDNISOLONE ACETATE 80 MG: 80 INJECTION, SUSPENSION INTRA-ARTICULAR; INTRALESIONAL; INTRAMUSCULAR; PARENTERAL; SOFT TISSUE at 13:52

## 2019-04-11 ENCOUNTER — TELEPHONE (OUTPATIENT)
Dept: PAIN MEDICINE | Facility: CLINIC | Age: 59
End: 2019-04-11

## 2019-09-16 ENCOUNTER — TELEPHONE (OUTPATIENT)
Dept: PAIN MEDICINE | Facility: MEDICAL CENTER | Age: 59
End: 2019-09-16

## 2019-09-16 NOTE — TELEPHONE ENCOUNTER
Pt called and stated he discussed with Dr Matt Barnes another epidural for his back      Last injection was in April of 2019    Pt can be reached at  [phone]

## 2019-09-19 ENCOUNTER — OFFICE VISIT (OUTPATIENT)
Dept: PAIN MEDICINE | Facility: CLINIC | Age: 59
End: 2019-09-19
Payer: COMMERCIAL

## 2019-09-19 VITALS
HEART RATE: 77 BPM | BODY MASS INDEX: 19.88 KG/M2 | DIASTOLIC BLOOD PRESSURE: 77 MMHG | SYSTOLIC BLOOD PRESSURE: 125 MMHG | WEIGHT: 150 LBS | HEIGHT: 73 IN

## 2019-09-19 DIAGNOSIS — G89.4 CHRONIC PAIN SYNDROME: Primary | ICD-10-CM

## 2019-09-19 DIAGNOSIS — M50.120 CERVICAL DISC DISORDER WITH RADICULOPATHY OF MID-CERVICAL REGION: ICD-10-CM

## 2019-09-19 PROCEDURE — 99214 OFFICE O/P EST MOD 30 MIN: CPT | Performed by: NURSE PRACTITIONER

## 2019-09-19 NOTE — PROGRESS NOTES
Assessment:  1  Chronic pain syndrome    2  Cervical disc disorder with radiculopathy of mid-cervical region        Plan:  Placido Brandt is a 62 y o  male chronic pain syndrome secondary to cervical disc disorder radiculopathy of the mid cervical region, lumbar disc herniation, lumbar radiculopathy, lumbar stenosis, cervical stenosis and cervical spondylosis  The patient presents today with neck pain which has worsened since last office visit  The patient reports that his pain and symptoms are the same pain and symptoms he experienced prior to undergoing cervical epidural steroid injection on 4/4/2019 which provided him 90% pain relief for 4-5 months  He is requesting to repeat this injection at this time  He was educated on the procedures most common risks  He verbalized understanding would like to proceed with the procedure  Therefore, the patient be scheduled for upcoming Tuesday, Thursday or Friday  Complete risks and benefits including bleeding, infection, tissue reaction, nerve injury and allergic reaction were discussed  The approach was demonstrated using models and literature was provided  The patient will follow up in 8 weeks or sooner with the worsening of symptoms  My impressions and treatment recommendations were discussed in detail with the patient who verbalized understanding and had no further questions  Discharge instructions were provided  I personally saw and examined the patient and I agree with the above discussed plan of care  Orders Placed This Encounter   Procedures    [place] spine and pain procedure     Standing Status:   Future     Standing Expiration Date:   9/19/2023     Order Specific Question:   Reason for Exam:     Answer:   CHACORTA     Order Specific Question:   Anticoagulant hold needed? Answer:   No     No orders of the defined types were placed in this encounter        History of Present Illness:  Placido Brandt is a 62 y o  male chronic pain syndrome secondary to cervical disc disorder radiculopathy of the mid cervical region, lumbar disc herniation, lumbar radiculopathy, lumbar stenosis, cervical stenosis and cervical spondylosis  The patient was last seen office on 4/4/2019 where he underwent cervical epidural steroid injection  He presents for a follow up office visit in regards to Hip Pain; Arm Pain; Shoulder Pain; Knee Pain; Neck Pain; Foot Pain; Back Pain; and Leg Pain  The patients current symptoms include neck pain that radiates into his bilateral shoulders and down his right arm into his elbow  He also reports pain that symptoms radiate into the upper aspect of his back  He describes his pain as burning, dull aching, sharp, throbbing, cramping, shooting, numbness pain that is constant nature with symptoms worsening during the morning nighttime hours  The patient reports that his pain and  symptoms have worsened since last office visit  He currently rates his pain a 9 out 10 numeric pain scale  I have personally reviewed and/or updated the patient's past medical history, past surgical history, family history, social history, current medications, allergies, and vital signs today  Review of Systems   Respiratory: Negative for shortness of breath  Cardiovascular: Negative for chest pain  Gastrointestinal: Positive for constipation  Negative for diarrhea, nausea and vomiting  Musculoskeletal: Positive for gait problem and joint swelling  Negative for arthralgias and myalgias  Skin: Negative for rash  Neurological: Positive for weakness  Negative for dizziness and seizures  All other systems reviewed and are negative  Patient Active Problem List   Diagnosis    Cervical disc disorder with radiculopathy of mid-cervical region       Past Medical History:   Diagnosis Date    Hyperlipidemia        No past surgical history on file  No family history on file      Social History     Occupational History    Not on file   Tobacco Use    Smoking status: Former Smoker   Substance and Sexual Activity    Alcohol use: No    Drug use: No    Sexual activity: Not on file       Current Outpatient Medications on File Prior to Visit   Medication Sig    gabapentin (NEURONTIN) 300 mg capsule Take 100 mg by mouth 3 (three) times a day    naproxen (NAPROSYN) 500 mg tablet Take 500 mg by mouth 2 (two) times a day with meals    pantoprazole (PROTONIX) 40 mg tablet Take 1 tablet by mouth    simvastatin (ZOCOR) 20 mg tablet Take 20 mg by mouth daily at bedtime     No current facility-administered medications on file prior to visit  Allergies   Allergen Reactions    Aspirin        Physical Exam:    /77   Pulse 77   Ht 6' 1" (1 854 m)   Wt 68 kg (150 lb)   BMI 19 79 kg/m²     Constitutional:normal, well developed, well nourished, alert, in no distress and non-toxic and no overt pain behavior    Eyes:anicteric  HEENT:grossly intact  Neck:supple, symmetric, trachea midline and no masses   Pulmonary:even and unlabored  Cardiovascular:No edema or pitting edema present  Skin:Normal without rashes or lesions and well hydrated  Psychiatric:Mood and affect appropriate  Neurologic:Cranial Nerves II-XII grossly intact  Musculoskeletal:normal     Cervical Spine Exam    Appearance:  Normal lordosis  Palpation/Tenderness:  left cervical paraspinal tenderness  right cervical paraspinal tenderness  left trapezium tenderness  right trapezium tenderness  Sensory:  no sensory deficits noted  Range of Motion:  Flexion:  Minimally limited  with pain  Extension:  Minimally limited  with pain  Lateral Flexion - Left:  Minimally limited  with pain  Lateral Flexion - Right:  Minimally limited  with pain  Rotation - Left:  Minimally limited  with pain  Rotation - Right:  Minimally limited  with pain  Motor Strength:  Left Arm Flexion  5/5  Left Arm Extension  5/5  Right Arm Flexion  5/5  Right Arm Extension  5/5  Left Wrist Flexion  5/5  Left Wrist Extension 5/5  Left Finger Abduction  5/5  Right Finger Abduction  5/5  Left    5/5  Right   5/5         Imaging

## 2019-10-15 ENCOUNTER — HOSPITAL ENCOUNTER (OUTPATIENT)
Dept: RADIOLOGY | Facility: CLINIC | Age: 59
Discharge: HOME | End: 2019-10-15
Attending: ANESTHESIOLOGY
Payer: COMMERCIAL

## 2019-10-15 VITALS
DIASTOLIC BLOOD PRESSURE: 68 MMHG | SYSTOLIC BLOOD PRESSURE: 97 MMHG | OXYGEN SATURATION: 96 % | TEMPERATURE: 98 F | HEART RATE: 71 BPM | RESPIRATION RATE: 18 BRPM

## 2019-10-15 DIAGNOSIS — M50.120 CERVICAL DISC DISORDER WITH RADICULOPATHY OF MID-CERVICAL REGION: ICD-10-CM

## 2019-10-15 PROCEDURE — 62321 NJX INTERLAMINAR CRV/THRC: CPT | Performed by: ANESTHESIOLOGY

## 2019-10-15 RX ORDER — METHYLPREDNISOLONE ACETATE 80 MG/ML
80 INJECTION, SUSPENSION INTRA-ARTICULAR; INTRALESIONAL; INTRAMUSCULAR; PARENTERAL; SOFT TISSUE ONCE
Status: COMPLETED | OUTPATIENT
Start: 2019-10-15 | End: 2019-10-15

## 2019-10-15 RX ORDER — LIDOCAINE HYDROCHLORIDE 10 MG/ML
5 INJECTION, SOLUTION EPIDURAL; INFILTRATION; INTRACAUDAL; PERINEURAL ONCE
Status: COMPLETED | OUTPATIENT
Start: 2019-10-15 | End: 2019-10-15

## 2019-10-15 RX ADMIN — METHYLPREDNISOLONE ACETATE 80 MG: 80 INJECTION, SUSPENSION INTRA-ARTICULAR; INTRALESIONAL; INTRAMUSCULAR; PARENTERAL; SOFT TISSUE at 15:08

## 2019-10-15 RX ADMIN — LIDOCAINE HYDROCHLORIDE 5 ML: 10 INJECTION, SOLUTION EPIDURAL; INFILTRATION; INTRACAUDAL; PERINEURAL at 15:06

## 2019-10-15 RX ADMIN — IOHEXOL 1 ML: 300 INJECTION, SOLUTION INTRAVENOUS at 15:08

## 2019-10-15 NOTE — DISCHARGE INSTR - LAB
Epidural Steroid Injection   WHAT YOU NEED TO KNOW:   An epidural steroid injection ([facility]) is a procedure to inject steroid medicine into the epidural space  The epidural space is between your spinal cord and vertebrae  Steroids reduce inflammation and fluid buildup in your spine that may be causing pain  You may be given pain medicine along with the steroids  ACTIVITY  · Do not drive or operate machinery today  · No strenuous activity today - bending, lifting, etc   · You may resume normal activites starting tomorrow - start slowly and as tolerated  · You may shower today, but no tub baths or hot tubs  · You may have numbness for several hours from the local anesthetic  Please use caution and common sense, especially with weight-bearing activities  CARE OF THE INJECTION SITE  · If you have soreness or pain, apply ice to the area today (20 minutes on/20 minutes off)  · Starting tomorrow, you may use warm, moist heat or ice if needed  · You may have an increase or change in your discomfort for 36-48 hours after your treatment  · Apply ice and continue with any pain medication you have been prescribed  · Notify [facility] if you have any of the following: redness, drainage, swelling, headache, stiff neck or fever above 100°F     SPECIAL INSTRUCTIONS  · Our office will contact you in approximately 7 days for a progress report  MEDICATIONS  · Continue to take all routine medications  · Our office may have instructed you to hold some medications  If you have a problem specifically related to your procedure, please call our office at [phone]  Problems not related to your procedure should be directed to your primary care physician

## 2019-10-15 NOTE — H&P
History of Present Illness: The patient is a 62 y o  male who presents with complaints of neck and arm pain secondary cervical disc bulging and is here today for cervical epidural steroid injection  Patient Active Problem List   Diagnosis    Cervical disc disorder with radiculopathy of mid-cervical region       Past Medical History:   Diagnosis Date    Hyperlipidemia        No past surgical history on file  Current Outpatient Medications:     gabapentin (NEURONTIN) 300 mg capsule, Take 100 mg by mouth 3 (three) times a day, Disp: , Rfl:     naproxen (NAPROSYN) 500 mg tablet, Take 500 mg by mouth 2 (two) times a day with meals, Disp: , Rfl:     pantoprazole (PROTONIX) 40 mg tablet, Take 1 tablet by mouth, Disp: , Rfl:     simvastatin (ZOCOR) 20 mg tablet, Take 20 mg by mouth daily at bedtime, Disp: , Rfl:     Current Facility-Administered Medications:     iohexol (OMNIPAQUE) 300 mg/mL injection 50 mL, 50 mL, Epidural, Once, Sita Webb MD    lidocaine (PF) (XYLOCAINE-MPF) 1 % injection 5 mL, 5 mL, Infiltration, Once, Sita Webb MD    methylPREDNISolone acetate (DEPO-MEDROL) injection 80 mg, 80 mg, Epidural, Once, Sita Webb MD    Allergies   Allergen Reactions    Aspirin        Physical Exam:   Vitals:    10/15/19 1449   BP: 104/73   Pulse: 63   Resp: 20   Temp: 98 °F (36 7 °C)   SpO2: 100%     General: Awake, Alert, Oriented x 3, Mood and affect appropriate  Respiratory: Respirations even and unlabored  Cardiovascular: Peripheral pulses intact; no edema  Musculoskeletal Exam:   Neck and arm tenderness    ASA Score: 2    Patient/Chart Verification  Patient ID Verified: Verbal  ID Band Applied: No  Consents Confirmed: Procedural, To be obtained in the Pre-Procedure area  H&P( within 30 days) Verified: To be obtained in the Pre-Procedure area  Allergies Reviewed: Yes  Anticoag/NSAID held?: No  Currently on antibiotics?: Yes(FQ made aware)    Assessment:   1   Cervical disc disorder with radiculopathy of mid-cervical region        Plan: CHACORTA

## 2019-10-22 ENCOUNTER — TELEPHONE (OUTPATIENT)
Dept: PAIN MEDICINE | Facility: CLINIC | Age: 59
End: 2019-10-22

## 2019-11-04 NOTE — TELEPHONE ENCOUNTER
Patient left a voicemail on 11/1/19 at 5:07pm returning call  Patient said he is "recovering well"  LVMOM to provide % of relief and pain level

## 2022-03-07 ENCOUNTER — TELEPHONE (OUTPATIENT)
Dept: OBGYN CLINIC | Facility: HOSPITAL | Age: 62
End: 2022-03-07

## 2022-03-14 ENCOUNTER — HOSPITAL ENCOUNTER (OUTPATIENT)
Dept: RADIOLOGY | Facility: HOSPITAL | Age: 62
Discharge: HOME | End: 2022-03-14
Payer: COMMERCIAL

## 2022-03-14 DIAGNOSIS — R05.9 COUGH: ICD-10-CM

## 2022-03-14 DIAGNOSIS — R06.02 SOB (SHORTNESS OF BREATH): ICD-10-CM

## 2022-03-14 PROCEDURE — 71046 X-RAY EXAM CHEST 2 VIEWS: CPT

## 2022-03-22 ENCOUNTER — OFFICE VISIT (OUTPATIENT)
Dept: DERMATOLOGY | Facility: CLINIC | Age: 62
End: 2022-03-22
Payer: COMMERCIAL

## 2022-03-22 VITALS — BODY MASS INDEX: 20.67 KG/M2 | HEIGHT: 73 IN | TEMPERATURE: 99.2 F | WEIGHT: 156 LBS

## 2022-03-22 DIAGNOSIS — L72.0 EPIDERMAL INCLUSION CYST: Primary | ICD-10-CM

## 2022-03-22 PROCEDURE — 99203 OFFICE O/P NEW LOW 30 MIN: CPT | Performed by: DERMATOLOGY

## 2022-03-22 RX ORDER — KETOCONAZOLE 20 MG/G
CREAM TOPICAL
COMMUNITY
Start: 2022-03-03 | End: 2022-04-21 | Stop reason: ALTCHOICE

## 2022-03-22 RX ORDER — AZITHROMYCIN 250 MG/1
TABLET, FILM COATED ORAL
COMMUNITY
Start: 2022-03-14 | End: 2022-04-13

## 2022-03-22 RX ORDER — CITALOPRAM 40 MG/1
40 TABLET ORAL DAILY
COMMUNITY
Start: 2022-03-03

## 2022-03-22 NOTE — PATIENT INSTRUCTIONS
Assessment and Plan:  Based on a thorough discussion of this condition and the management approach to it (including a comprehensive discussion of the known risks, side effects and potential benefits of treatment), the patient (family) agrees to implement the following specific plan:   Reassure benign   Discussed treatment options with the patient  Patient would like to get an excision   Referral to plastic surgery for evaluation and treatment  (ORDERED)    Follow up as needed

## 2022-03-22 NOTE — PROGRESS NOTES
[facility] Note     Patient Name: Bhupendra Fernandes  Encounter Date: 3/22/2022     Have you been cared for by a [facility] in the last 3 years and, if so, which one? No    · Have you traveled outside of the [address] [place] [address] in the past 3 months or outside of the [facility] area in the last 2 weeks? No     May we call your Preferred Phone number to discuss your specific medical information? Yes     May we leave a detailed message that includes your specific medical information? Yes      Today's Chief Concerns:   Concern #1:  Lumps on neck and both sides of jaw line  Past Medical History:  Have you personally ever had or currently have any of the following? · Skin cancer (such as Melanoma, Basal Cell Carcinoma, Squamous Cell Carcinoma? (If Yes, please provide more detail)- No  · Eczema: No  · Psoriasis: No  · HIV/AIDS: No  · Hepatitis B or C: No  · Tuberculosis: No  · Systemic Immunosuppression such as Diabetes, Biologic or Immunotherapy, Chemotherapy, Organ Transplantation, Bone Marrow Transplantation (If YES, please provide more detail): No  · Radiation Treatment (If YES, please provide more detail): No  · Any other major medical conditions/concerns? 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 Not working      What are your hobbies/past-times? Walking     Family History:  Have any of your "first degree relatives" (parent, brother, sister, or child) had any of the following       · Skin cancer such as Melanoma or Merkel Cell Carcinoma or Pancreatic Cancer? No  · Eczema, Asthma, Hay Fever or Seasonal Allergies: No  · Psoriasis or Psoriatic Arthritis: No  · Do any other medical conditions seem to run in your family? If Yes, what condition and which relatives?   No    Current Medications:       Current Outpatient Medications:     citalopram (CeleXA) 40 mg tablet, , Disp: , Rfl:     gabapentin (NEURONTIN) 300 mg capsule, Take 100 mg by mouth 3 (three) times a day, Disp: , Rfl:     naproxen (NAPROSYN) 500 mg tablet, Take 500 mg by mouth 2 (two) times a day with meals, Disp: , Rfl:     pantoprazole (PROTONIX) 40 mg tablet, Take 1 tablet by mouth, Disp: , Rfl:     simvastatin (ZOCOR) 20 mg tablet, Take 20 mg by mouth daily at bedtime, Disp: , Rfl:     azithromycin (ZITHROMAX) 250 mg tablet, , Disp: , Rfl:     ketoconazole (NIZORAL) 2 % cream, , Disp: , Rfl:     mupirocin (BACTROBAN) 2 % ointment, , Disp: , Rfl:       Review of Systems:  Have you recently had or currently have any of the following? If YES, what are you doing for the problem? · Fever, chills or unintended weight loss: No  · Sudden loss or change in your vision: No  · Nausea, vomiting or blood in your stool: No  · Painful or swollen joints: YES, Legs, back   · Wheezing or cough: No  · Changing mole or non-healing wound: No  · Nosebleeds: No  · Excessive sweating: No  · Easy or prolonged bleeding? No  · Over the last 2 weeks, how often have you been bothered by the following problems? · Taking little interest or pleasure in doing things: 1 - Not at All  · Feeling down, depressed, or hopeless: 1 - Not at All  · Rapid heartbeat with epinephrine:  No    · FEMALES ONLY:    · Are you pregnant or planning to become pregnant? N/A  · Are you currently or planning to be nursing or breast feeding? N/A    · Any known allergies? Allergies   Allergen Reactions    Aspirin          Physical Exam:     Was a chaperone (Derm Clinical Assistant) present throughout the entire Physical Exam? Yes     Did the Dermatology Team specifically  the patient on the importance of a Full Skin Exam to be sure that nothing is missed clinically?  Yes}  o Did the patient ultimately request or accept a Full Skin Exam?  NO  o   CONSTITUTIONAL:   Vitals:    03/22/22 1323   Temp: 99 2 °F (37 3 °C)   TempSrc: Temporal   Weight: 70 8 kg (156 lb)   Height: 6' 1" (1 854 m)       PSYCH: Normal mood and affect  EYES: Normal conjunctiva  ENT: Normal lips and oral mucosa  CARDIOVASCULAR: No upper-limb edema  RESPIRATORY: Normal respirations  HEME/LYMPH/IMMUNO:  No regional lymphadenopathy except as noted below in "ASSESSMENT AND PLAN BY DIAGNOSIS"    SKIN:  FOCUSED ORGAN SYSTEM EXAM   Face Normal except as noted below in Assessment   Neck Normal except as noted below in Assessment       Assessment and Plan by Diagnosis:    History of Present Condition:     Duration:  How long has this been an issue for you?    o  Noticed it 2- 3 years ago    Location Affected:  Where on the body is this affecting you?    o  Chin area and jaw line area   Quality:  Is there any bleeding, pain, itch, burning/irritation, or redness associated with the skin lesion? o  Pus/drainage smell    Severity:  Describe any bleeding, pain, itch, burning/irritation, or redness on a scale of 1 to 10 (with 10 being the worst)  o  0   Timing:  Does this condition seem to be there pretty constantly or do you notice it more at specific times throughout the day?    o  Constantly    Context:  Have you ever noticed that this condition seems to be associated with specific activities you do?    o  Unknown    Modifying Factors:    o Anything that seems to make the condition worse?    -  Unknown   o What have you tried to do to make the condition better?    -  Azithromycin for 5-6 days, Amoxicillin for 10 days - he noticed slightly decreased in size      Associated Signs and Symptoms:  Does this skin lesion seem to be associated with any of the following:  o  SL AMB DERM SIGNS AND SYMPTOMS: Pus or Discharge     1  EPIDERMAL INCLUSION CYSTS    Physical Exam:   Anatomic Location Affected:  Neck, both sides jaw line area   Morphological Description:  Soft movable non-tender nodules up to about 3 cm; see photos   Pertinent Positives:   Pertinent Negatives:     Additional History of Present Condition:  He says he has history of cysts, He feels after he shaves they formed  Assessment and Plan:  Based on a thorough discussion of this condition and the management approach to it (including a comprehensive discussion of the known risks, side effects and potential benefits of treatment), the patient (family) agrees to implement the following specific plan:   Reassure benign   Discussed treatment options with the patient  Patient would like to get an excision   Referral to plastic surgery for evaluation and treatment  (ORDERED)    Follow up as needed         Scribe Attestation    I,:  Marilou Yi am acting as a scribe while in the presence of the attending physician :       I,:  Monroe Shepherd MD personally performed the services described in this documentation    as scribed in my presence :       Dr Ana Chang

## 2022-04-13 ENCOUNTER — OFFICE VISIT (OUTPATIENT)
Dept: PAIN MEDICINE | Facility: CLINIC | Age: 62
End: 2022-04-13
Payer: COMMERCIAL

## 2022-04-13 VITALS
DIASTOLIC BLOOD PRESSURE: 71 MMHG | WEIGHT: 156 LBS | BODY MASS INDEX: 20.58 KG/M2 | HEART RATE: 104 BPM | SYSTOLIC BLOOD PRESSURE: 112 MMHG

## 2022-04-13 DIAGNOSIS — M51.16 INTERVERTEBRAL DISC DISORDER WITH RADICULOPATHY OF LUMBAR REGION: Primary | ICD-10-CM

## 2022-04-13 DIAGNOSIS — M50.120 CERVICAL DISC DISORDER WITH RADICULOPATHY OF MID-CERVICAL REGION: ICD-10-CM

## 2022-04-13 PROCEDURE — 99214 OFFICE O/P EST MOD 30 MIN: CPT | Performed by: ANESTHESIOLOGY

## 2022-04-13 NOTE — PROGRESS NOTES
Assessment:  1  Intervertebral disc disorder with radiculopathy of lumbar region    2  Cervical disc disorder with radiculopathy of mid-cervical region        Plan:  The patient is experiencing recurrence of pain in the neck rating down the right arm as well as low back into both legs but worse on the right  At this time, I discussed repeating the epidural steroid injections that provided significant relief from previously  He would like to proceed and will be scheduled for bilateral L4 transforaminal epidural steroid injection followed by cervical epidural steroid injection 2 weeks later  For now, he will continue with gabapentin and naproxen and does not need any refills  Complete risks and benefits including bleeding, infection, tissue reaction, nerve injury and allergic reaction were discussed  The approach was demonstrated using models and literature was provided  Verbal and written consent was obtained  My impressions and treatment recommendations were discussed in detail with the patient who verbalized understanding and had no further questions  Discharge instructions were provided  I personally saw and examined the patient and I agree with the above discussed plan of care  Orders Placed This Encounter   Procedures    FL spine and pain procedure     Standing Status:   Future     Standing Expiration Date:   4/13/2026     Order Specific Question:   Reason for Exam:     Answer:   Bilateral L4 TF AMA     Order Specific Question:   Anticoagulant hold needed? Answer:   No    FL spine and pain procedure     Standing Status:   Future     Standing Expiration Date:   4/13/2026     Order Specific Question:   Reason for Exam:     Answer:   CHACORTA     Order Specific Question:   Anticoagulant hold needed? Answer:   No     No orders of the defined types were placed in this encounter        History of Present Illness:  Too Arnold is a 64 y o  male who presents for a follow up office visit in regards to Neck Pain, Back Pain, Leg Pain, and Arm Pain  The patient has a history of cervical disc disorder with radiculopathy and lumbar disc disorder with radiculopathy and returns for follow-up  He was last seen in 2019 at which time he underwent cervical epidural steroid injection as well as bilateral L4 transforaminal epidural steroid injection which provided significant relief for him  He reports the symptoms recurred about 9 months ago worse in the low back into both legs where he feels weakness as well as neck into the right arm  Symptoms are moderate rated 7/10 on a numeric rating scale  He has been using naproxen and gabapentin which are helpful  I have personally reviewed and/or updated the patient's past medical history, past surgical history, family history, social history, current medications, allergies, and vital signs today  Review of Systems   Respiratory: Negative for shortness of breath  Cardiovascular: Negative for chest pain  Gastrointestinal: Negative for constipation, diarrhea, nausea and vomiting  Musculoskeletal: Positive for back pain, gait problem, joint swelling and neck pain  Negative for arthralgias and myalgias  Skin: Negative for rash  Neurological: Negative for dizziness, seizures and weakness  All other systems reviewed and are negative  Patient Active Problem List   Diagnosis    Cervical disc disorder with radiculopathy of mid-cervical region       Past Medical History:   Diagnosis Date    Hyperlipidemia        No past surgical history on file  No family history on file      Social History     Occupational History    Not on file   Tobacco Use    Smoking status: Current Some Day Smoker     Types: Cigarettes    Smokeless tobacco: Current User   Substance and Sexual Activity    Alcohol use: No    Drug use: No    Sexual activity: Not on file       Current Outpatient Medications on File Prior to Visit   Medication Sig    citalopram (CeleXA) 40 mg tablet     gabapentin (NEURONTIN) 300 mg capsule Take 100 mg by mouth 3 (three) times a day    ketoconazole (NIZORAL) 2 % cream  (Patient not taking: Reported on 3/22/2022 )    mupirocin (BACTROBAN) 2 % ointment  (Patient not taking: Reported on 3/22/2022 )    naproxen (NAPROSYN) 500 mg tablet Take 500 mg by mouth 2 (two) times a day with meals    pantoprazole (PROTONIX) 40 mg tablet Take 1 tablet by mouth    simvastatin (ZOCOR) 20 mg tablet Take 20 mg by mouth daily at bedtime    [DISCONTINUED] azithromycin (ZITHROMAX) 250 mg tablet  (Patient not taking: Reported on 3/22/2022 )     No current facility-administered medications on file prior to visit  Allergies   Allergen Reactions    Aspirin        Physical Exam:    /71   Pulse 104   Wt 70 8 kg (156 lb)   BMI 20 58 kg/m²     Constitutional:normal, well developed, well nourished, alert, in no distress and non-toxic and no overt pain behavior    Eyes:anicteric  HEENT:grossly intact  Neck:supple, symmetric, trachea midline and no masses   Pulmonary:even and unlabored  Cardiovascular:No edema or pitting edema present  Skin:Normal without rashes or lesions and well hydrated  Psychiatric:Mood and affect appropriate  Neurologic:Cranial Nerves II-XII grossly intact  Musculoskeletal:Examination of the neck reveals tenderness to light palpation on the right cervical paraspinal muscles culture with full strength in the bilateral upper extremity flexors/extensors; examination lumbar spine reveals tenderness bilaterally in the lumbar paraspinal musculature with mild weakness of bilateral hip flexors 4/5 and right foot dorsiflexion 4/5 otherwise 5/5 and the rest of the flexors/extensors

## 2022-04-15 ENCOUNTER — TELEPHONE (OUTPATIENT)
Dept: PAIN MEDICINE | Facility: CLINIC | Age: 62
End: 2022-04-15

## 2022-04-15 ENCOUNTER — CONSULT (OUTPATIENT)
Dept: PLASTIC SURGERY | Facility: CLINIC | Age: 62
End: 2022-04-15
Payer: COMMERCIAL

## 2022-04-15 VITALS
SYSTOLIC BLOOD PRESSURE: 110 MMHG | WEIGHT: 155 LBS | HEIGHT: 73 IN | DIASTOLIC BLOOD PRESSURE: 66 MMHG | HEART RATE: 98 BPM | BODY MASS INDEX: 20.54 KG/M2

## 2022-04-15 DIAGNOSIS — L72.0 EPIDERMAL INCLUSION CYST: ICD-10-CM

## 2022-04-15 PROCEDURE — 99204 OFFICE O/P NEW MOD 45 MIN: CPT | Performed by: PHYSICIAN ASSISTANT

## 2022-04-18 ENCOUNTER — TELEPHONE (OUTPATIENT)
Dept: PLASTIC SURGERY | Facility: CLINIC | Age: 62
End: 2022-04-18

## 2022-04-18 PROBLEM — L72.0 EPIDERMAL CYST OF FACE: Status: ACTIVE | Noted: 2022-04-18

## 2022-04-18 NOTE — PROGRESS NOTES
Assessment/Plan:    Patient is a 35-year-old male who presents to our office as a referral by Dr Junie Dakins of Dermatology for evaluation of 3 cysts on his jawline  Please see HPI  I discussed surgical excision  Patient understood and agreed  This will be done under local anesthesia  Discussed options, including forgoing surgery, as well as benefits and risks of surgery including but not limited to anesthesia, bleeding, infection, scarring and potential need for additional procedures  Consent was obtained and all questions answered to their satisfaction  The patient was advised not to smoke pre or postoperatively, as this can interfere with healing  We will plan for surgery at their earliest convenience  No problem-specific Assessment & Plan notes found for this encounter  Diagnoses and all orders for this visit:    Epidermal inclusion cyst  -     Ambulatory Referral to Plastic Surgery          Subjective:      Patient ID: Aline Mosher is a 64 y o  male  HPI     The patient presents to the office today for evaluation of 3 cystic lesions on his jaw  He reports that he does have a history of having cyst in the past, which were excised  The patient reports that he has noticed the largest of the cysts for the past several years  He states that it was initially small, has gradually grown in size  He has noticed a foul-smelling odor from the cyst at times  Cyst do not interfere with eating/chewing, and he is concerned of about because medic appearance only  Upon evaluation of the patient, he is noted to have 3 cystic lesions on his inferior jaw line, to on the left and 1 on the right  Largest cyst measures 2 5 x 2 5 cm, it is soft and mobile, no noted exudate  This is located inferiorly and to the left face chin  Approximately 1 cm posteriorly, there is an additional cystic lesion measuring approximately 0 7 cm  This is also soft and mobile    Third cystic lesion is located on the right inferior jaw, near the TMJ, soft and mobile measuring approximately 1 cm  The patient reports that he will be traveling to [place] due to family health issues  He would like these lesions to be excised prior to leaving if possible  He reports that he does smoke  He was advised to stop smoking, as this can interfere with healing  Patient voiced understanding  The following portions of the patient's history were reviewed and updated as appropriate: allergies, current medications, past family history, past medical history, past social history, past surgical history and problem list     Review of Systems    A 12 point review systems was completed and is negative except as per HPI    Objective:      /66   Pulse 98   Ht 6' 1" (1 854 m)   Wt 70 3 kg (155 lb)   BMI 20 45 kg/m²          Physical Exam  Vitals and nursing note reviewed  Constitutional:       General: He is not in acute distress  Appearance: Normal appearance  He is normal weight  He is not ill-appearing, toxic-appearing or diaphoretic  HENT:      Head: Normocephalic and atraumatic  Nose: Nose normal       Mouth/Throat:      Mouth: Mucous membranes are moist    Eyes:      Extraocular Movements: Extraocular movements intact  Conjunctiva/sclera: Conjunctivae normal       Pupils: Pupils are equal, round, and reactive to light  Cardiovascular:      Rate and Rhythm: Normal rate and regular rhythm  Pulmonary:      Effort: Pulmonary effort is normal  No respiratory distress  Breath sounds: Normal breath sounds  Abdominal:      General: Abdomen is flat  Palpations: Abdomen is soft  Musculoskeletal:         General: No swelling, tenderness, deformity or signs of injury  Normal range of motion  Cervical back: Normal range of motion and neck supple  No rigidity or tenderness  Skin:     General: Skin is warm and dry  Findings: Lesion present        Comments: See HPI   Neurological:      General: No focal deficit present  Mental Status: He is alert and oriented to person, place, and time  Cranial Nerves: No cranial nerve deficit  Sensory: No sensory deficit  Motor: No weakness     Psychiatric:         Mood and Affect: Mood normal          Behavior: Behavior normal

## 2022-04-18 NOTE — TELEPHONE ENCOUNTER
Scheduled pt 2 weeks after his jaw surgery for Tfesi and he is going to call once he returns from trip to schedule CHACORTA

## 2022-04-21 RX ORDER — ACETAMINOPHEN 500 MG
500 TABLET ORAL EVERY 6 HOURS PRN
COMMUNITY

## 2022-04-21 NOTE — PRE-PROCEDURE INSTRUCTIONS
Pre-Surgery Instructions:   Medication Instructions    acetaminophen (TYLENOL) 500 mg tablet Take day of surgery   citalopram (CeleXA) 40 mg tablet Take day of surgery   gabapentin (NEURONTIN) 300 mg capsule Take day of surgery   naproxen (NAPROSYN) 500 mg tablet Take day of surgery   pantoprazole (PROTONIX) 40 mg tablet Take day of surgery      simvastatin (ZOCOR) 20 mg tablet takes in the pm     Pt verbalizes understanding of the following:    - Bathing instructions, will use dial  - No lotions, powders, sprays, deodorant, jewelry    - Local anesth      - Bring list of meds with last dose noted  - ClipCard cards & photo id

## 2022-04-25 ENCOUNTER — HOSPITAL ENCOUNTER (OUTPATIENT)
Facility: HOSPITAL | Age: 62
Setting detail: OUTPATIENT SURGERY
Discharge: HOME | End: 2022-04-25
Attending: STUDENT IN AN ORGANIZED HEALTH CARE EDUCATION/TRAINING PROGRAM | Admitting: STUDENT IN AN ORGANIZED HEALTH CARE EDUCATION/TRAINING PROGRAM
Payer: COMMERCIAL

## 2022-04-25 VITALS
WEIGHT: 155 LBS | SYSTOLIC BLOOD PRESSURE: 106 MMHG | RESPIRATION RATE: 18 BRPM | BODY MASS INDEX: 20.54 KG/M2 | HEART RATE: 75 BPM | OXYGEN SATURATION: 100 % | TEMPERATURE: 97.8 F | DIASTOLIC BLOOD PRESSURE: 76 MMHG | HEIGHT: 73 IN

## 2022-04-25 DIAGNOSIS — L72.0 EPIDERMAL INCLUSION CYST: ICD-10-CM

## 2022-04-25 PROCEDURE — 11442 EXC FACE-MM B9+MARG 1.1-2 CM: CPT | Performed by: STUDENT IN AN ORGANIZED HEALTH CARE EDUCATION/TRAINING PROGRAM

## 2022-04-25 PROCEDURE — 11443 EXC FACE-MM B9+MARG 2.1-3 CM: CPT | Performed by: STUDENT IN AN ORGANIZED HEALTH CARE EDUCATION/TRAINING PROGRAM

## 2022-04-25 PROCEDURE — 99024 POSTOP FOLLOW-UP VISIT: CPT | Performed by: STUDENT IN AN ORGANIZED HEALTH CARE EDUCATION/TRAINING PROGRAM

## 2022-04-25 PROCEDURE — 88304 TISSUE EXAM BY PATHOLOGIST: CPT | Performed by: PATHOLOGY

## 2022-04-25 PROCEDURE — 13132 CMPLX RPR F/C/C/M/N/AX/G/H/F: CPT | Performed by: STUDENT IN AN ORGANIZED HEALTH CARE EDUCATION/TRAINING PROGRAM

## 2022-04-25 RX ORDER — GINSENG 100 MG
CAPSULE ORAL AS NEEDED
Status: DISCONTINUED | OUTPATIENT
Start: 2022-04-25 | End: 2022-04-25 | Stop reason: HOSPADM

## 2022-04-25 RX ORDER — ACETAMINOPHEN 325 MG/1
975 TABLET ORAL ONCE
Status: DISCONTINUED | OUTPATIENT
Start: 2022-04-25 | End: 2022-04-25

## 2022-04-25 RX ORDER — GABAPENTIN 300 MG/1
300 CAPSULE ORAL ONCE
Status: DISCONTINUED | OUTPATIENT
Start: 2022-04-25 | End: 2022-04-25

## 2022-04-25 RX ORDER — LIDOCAINE HYDROCHLORIDE AND EPINEPHRINE 10; 10 MG/ML; UG/ML
INJECTION, SOLUTION INFILTRATION; PERINEURAL AS NEEDED
Status: DISCONTINUED | OUTPATIENT
Start: 2022-04-25 | End: 2022-04-25 | Stop reason: HOSPADM

## 2022-04-25 NOTE — OP NOTE
OPERATIVE REPORT  PATIENT NAME: Yovani Adhikari    :  1960  MRN: [id number]  Pt Location: BE OR ROOM 05    SURGERY DATE: 2022    Surgeon(s) and Role:     * Alan Boyle MD - Primary    Preop Diagnosis:  Epidermal inclusion cyst [L72 0]    Post-Op Diagnosis Codes:     * Epidermal inclusion cyst [L72 0]    Procedure(s) (LRB):  1  Excision of #1 left facial cyst along jawline with complex closure (2 2x1 8 cm, complex closure 2 6 cm)  2  Excision of #2 left facial cyst along jawline with complex closure (1 3x0 7 cm, complex closure 1 5 cm)  3  Excision of #3 right facial cyst along jawline with complex closure (1 2x0 9 cm, complex closure 1 5 cm)    -All lesion excisions were performed through separate incisions    Specimen(s):  ID Type Source Tests Collected by Time Destination   1 :  Tissue Cyst TISSUE EXAM Alan Boyle MD 2022 [id number]        Estimated Blood Loss:   Minimal    Drains:  * No LDAs found *    Anesthesia Type:   Local    Operative Indications:  Epidermal inclusion cyst [L72 0]    Operative Findings: All lesions appeared to be sebaceous cysts  All lesions excised through separate incisions  Sizes as noted above    Complications:   None    Procedure and Technique:  Patient was brought to the operating room, transferred to the operating table in supine fashion  After injecting 1% lidocaine with epi total of 3 cc for local block around each lesion, a timeout was performed at which point all patient identifiers were deemed to be correct  The face was prepped and draped in the normal sterile fashion  I began on the left side, making an oblique incision, along the resting lines of skin tension overlying each of the subcutaneous lesions  I dissected down through skin and subcutaneous tissue until encountering the cysts  Each of the cysts were excised through separate incisions due to the locations  The largest cyst was excised en bloc  The smaller cyst was ruptured with dissection   The right facial cyst was also excised in similar fashion  All cysts were sent for routine pathology  Next the operative fields were irrigated and hemostasis was achieved with electrocautery  I then proceeded with complex closure  For cyst #2 and #3, the surrounding skin and subcutaneous tissue was undermined at least 1 cm in all directions  For cyst #1, the surrounding skin and subcutaneous tissue was undermined at least 2 cm in all directions  I then proceeded with closure of the dermis and subcutaneous tissue with 4-0 vicryl, followed by 5-0 nylon for the skin  Bacitracin was applied over the incisions and they were covered with gauze  This concluded the procedure  Patient tolerated the procedure well without complications  At the end of the case, all sponge, needle, and instrument counts were correct  Patient was awakened from anesthesia and taken to the PACU in stable condition      I was present for the entire procedure, A qualified resident physician was not available and A physician assistant was not required during the procedure for retraction, tissue handling, dissection and suturing        Patient Disposition:  PACU       SIGNATURE: Meaghan Myles MD  DATE: April 25, 2022  TIME: 6:05 PM

## 2022-04-25 NOTE — DISCHARGE INSTRUCTIONS
Discharge instructions    -Over-the-counter tylenol or ibuprofen for pain   -Do not get wet for 24 hours  After 24 hours, can splash wet and pat dry  Apply antibiotic ointment to incision (bacitracin)  Do not submerge incisions (no baths, pools, hottubs)     -Keep incision clean and covered   -No strenuous activity, no heavy lifting (nothing over 5 lbs), no bending over, nothing that increases heart rate as this can increase bleeding, bruising, and swelling   -Resume regular diet and home medications  -Call Plastic Surgery office to schedule post-op follow in 7 days for suture removal       Pierre Jameson MD   [facility] Plastic and Reconstructive Surgery   [address] [facility] [address], [address]   Office: [phone]

## 2022-04-25 NOTE — DISCHARGE SUMMARY
Discharge Summary - Plastic Surgery   Chen Canales 64 y o  male MRN: [id number]  Unit/Bed#: OR POOL Encounter: [id number]    Admission Date:  4/25/22    Discharge Date: 4/25/22    Admitting Diagnosis: Epidermal inclusion cyst [L72 0]    Discharge Diagnosis: same    Medical Problems             Resolved Problems  Date Reviewed: 4/18/2022    None                Attending: Mirna Batista Physician(s): none    Procedures Performed: Excision of facial cysts (right x1, left x2) with complex clsoure    Pathology: routine    Hospital Course: Patient underwent above noted procedure without complication and was discharged with RTC in 7 days for suture removal     Condition at Discharge: good     Discharge instructions/Information to patient and family:   Discharge instructions    -Over-the-counter tylenol or ibuprofen for pain   -Do not get wet for 24 hours  After 24 hours, can splash wet and pat dry  Apply antibiotic ointment to incision (bacitracin)  Do not submerge incisions (no baths, pools, hottubs)  -Keep incision clean and covered   -No strenuous activity, no heavy lifting (nothing over 5 lbs), no bending over, nothing that increases heart rate as this can increase bleeding, bruising, and swelling   -Resume regular diet and home medications  -Call Plastic Surgery office to schedule post-op follow in 7 days for suture removal       Vianne Hashimoto, MD   [facility] Plastic and Reconstructive Surgery   [address] [facility] [address], [address] [facility]   Office: [phone]      Provisions for [address] [place] [address] Avenue:  See after visit summary for information related to follow-up care and any pertinent home health orders  Disposition: Home          Planned Readmission: No    Discharge Statement   I spent 10 minutes discharging the patient  This time was spent on the day of discharge  I had direct contact with the patient on the day of discharge   Additional documentation is required if more than 30 minutes were spent on discharge  Discharge Medications:  See after visit summary for reconciled discharge medications provided to patient and family

## 2022-04-29 ENCOUNTER — TELEPHONE (OUTPATIENT)
Dept: PLASTIC SURGERY | Facility: CLINIC | Age: 62
End: 2022-04-29

## 2022-04-29 NOTE — TELEPHONE ENCOUNTER
Called patient to schedule psot op appt, he states he is out of town  I expressed to patient risks of extending suture removal  He understands

## 2022-05-05 ENCOUNTER — OFFICE VISIT (OUTPATIENT)
Dept: PLASTIC SURGERY | Facility: CLINIC | Age: 62
End: 2022-05-05

## 2022-05-05 DIAGNOSIS — L72.0 EPIDERMAL INCLUSION CYST: Primary | ICD-10-CM

## 2022-05-05 PROCEDURE — 99024 POSTOP FOLLOW-UP VISIT: CPT | Performed by: PHYSICIAN ASSISTANT

## 2022-05-05 RX ORDER — SULFAMETHOXAZOLE AND TRIMETHOPRIM 800; 160 MG/1; MG/1
1 TABLET ORAL EVERY 12 HOURS SCHEDULED
Qty: 20 TABLET | Refills: 0 | Status: SHIPPED | OUTPATIENT
Start: 2022-05-05 | End: 2022-05-12 | Stop reason: SDUPTHER

## 2022-05-05 NOTE — PROGRESS NOTES
POST-OP EVALUATION  5/5/2022    Subjective   Sabiha Herrera is a 64 y o  male is here today for routine post-operative follow up  He is s/p excision of 3 cysts on face on 4/25/22, and presents for suture removal  One cyst in particular is painful to him  Past Medical History:   Diagnosis Date    Anxiety     Arthritis     Chronic pain disorder     spine    GERD (gastroesophageal reflux disease)     Hyperlipidemia     Other cysts of jaw     Wears glasses      Past Surgical History:   Procedure Laterality Date    COLONOSCOPY      CYST REMOVAL      VT EXC SKIN BENIG >4 CM FACE,FACIAL Bilateral 4/25/2022    Procedure: EXCISION OF CYSTS ON JAWLINE  2 ON LEFT, 1 ON RIGHT;  Surgeon: Gita Price MD;  [facility]: [facility];  Service: Plastics    VT RECMPL WND TRUNK 1 1-2 5 CM Bilateral 4/25/2022    Procedure: CLOSURE WOUND ON JAWLINE  2 ON LEFT, 1 ON RIGHT;  Surgeon: Gita Price MD;  Location: [facility];  Service: Plastics        Current Outpatient Medications:     acetaminophen (TYLENOL) 500 mg tablet, Take 500 mg by mouth every 6 (six) hours as needed for mild pain, Disp: , Rfl:     citalopram (CeleXA) 40 mg tablet, Take 40 mg by mouth daily  , Disp: , Rfl:     gabapentin (NEURONTIN) 300 mg capsule, Take 100 mg by mouth 3 (three) times a day, Disp: , Rfl:     naproxen (NAPROSYN) 500 mg tablet, Take 500 mg by mouth 2 (two) times a day with meals, Disp: , Rfl:     pantoprazole (PROTONIX) 40 mg tablet, Take 1 tablet by mouth, Disp: , Rfl:     simvastatin (ZOCOR) 20 mg tablet, Take 20 mg by mouth daily at bedtime, Disp: , Rfl:     sulfamethoxazole-trimethoprim (BACTRIM DS) 800-160 mg per tablet, Take 1 tablet by mouth every 12 (twelve) hours for 10 days, Disp: 20 tablet, Rfl: 0  Allergies: Aspirin    Objective      Incision clean, dry, intact  Sutures removed  Fluctuance of cysts on his left chin  Serosanguinous fluid and caseous material expressed      Assessment/Plan   Diagnoses and all orders for this visit:    Epidermal inclusion cyst  -     sulfamethoxazole-trimethoprim (BACTRIM DS) 800-160 mg per tablet; Take 1 tablet by mouth every 12 (twelve) hours for 10 days    Bactrim DS for 10 days  Salicylic wash for affected areas, facial and elsewhere  Bacitracin to incisions for 2 weeks, then refer to scar care: Followup in 10 days  Post Op Scar Care Instructions:  -Massage silicone scar gel (ex, Biocorneum, [employer] Silicone Scar Gel, Scar Away) into incision twice daily for 3 months  Or   -Apply silicone scar patch (ex, Oleeva) to incision for up to 23 hours per day for 3 months    -Wear SPF 30+ on incision at all times with any sun exposure  Call with any questions or concerns          Kvng Batista PA-C

## 2022-05-10 ENCOUNTER — HOSPITAL ENCOUNTER (OUTPATIENT)
Dept: RADIOLOGY | Facility: CLINIC | Age: 62
Discharge: HOME | End: 2022-05-10
Attending: ANESTHESIOLOGY

## 2022-05-10 ENCOUNTER — TELEPHONE (OUTPATIENT)
Dept: RADIOLOGY | Facility: CLINIC | Age: 62
End: 2022-05-10

## 2022-05-10 VITALS
RESPIRATION RATE: 18 BRPM | OXYGEN SATURATION: 98 % | HEART RATE: 87 BPM | SYSTOLIC BLOOD PRESSURE: 110 MMHG | TEMPERATURE: 98.6 F | DIASTOLIC BLOOD PRESSURE: 73 MMHG

## 2022-05-10 DIAGNOSIS — M51.16 INTERVERTEBRAL DISC DISORDER WITH RADICULOPATHY OF LUMBAR REGION: ICD-10-CM

## 2022-05-10 DIAGNOSIS — M51.16 INTERVERTEBRAL DISC DISORDER WITH RADICULOPATHY OF LUMBAR REGION: Primary | ICD-10-CM

## 2022-05-10 RX ORDER — METHYLPREDNISOLONE ACETATE 80 MG/ML
80 INJECTION, SUSPENSION INTRA-ARTICULAR; INTRALESIONAL; INTRAMUSCULAR; PARENTERAL; SOFT TISSUE ONCE
Status: DISCONTINUED | OUTPATIENT
Start: 2022-05-10 | End: 2022-05-14 | Stop reason: HOSPADM

## 2022-05-10 RX ORDER — BUPIVACAINE HCL/PF 2.5 MG/ML
2 VIAL (ML) INJECTION ONCE
Status: DISCONTINUED | OUTPATIENT
Start: 2022-05-10 | End: 2022-05-14 | Stop reason: HOSPADM

## 2022-05-10 RX ORDER — 0.9 % SODIUM CHLORIDE 0.9 %
4 VIAL (ML) INJECTION ONCE
Status: DISCONTINUED | OUTPATIENT
Start: 2022-05-10 | End: 2022-05-14 | Stop reason: HOSPADM

## 2022-05-10 RX ORDER — METHYLPREDNISOLONE 4 MG/1
TABLET ORAL
Qty: 21 TABLET | Refills: 0 | Status: SHIPPED | OUTPATIENT
Start: 2022-05-10

## 2022-05-10 NOTE — DISCHARGE INSTR - LAB
Epidural Steroid Injection   WHAT YOU NEED TO KNOW:   An epidural steroid injection (AMA) is a procedure to inject steroid medicine into the epidural space  The epidural space is between your spinal cord and vertebrae  Steroids reduce inflammation and fluid buildup in your spine that may be causing pain  You may be given pain medicine along with the steroids  ACTIVITY  Do not drive or operate machinery today  No strenuous activity today - bending, lifting, etc   You may resume normal activites starting tomorrow - start slowly and as tolerated  You may shower today, but no tub baths or hot tubs  You may have numbness for several hours from the local anesthetic  Please use caution and common sense, especially with weight-bearing activities  CARE OF THE INJECTION SITE  If you have soreness or pain, apply ice to the area today (20 minutes on/20 minutes off)  Starting tomorrow, you may use warm, moist heat or ice if needed  You may have an increase or change in your discomfort for 36-48 hours after your treatment  Apply ice and continue with any pain medication you have been prescribed  Notify the [facility] if you have any of the following: redness, drainage, swelling, headache, stiff neck or fever above 100°F     SPECIAL INSTRUCTIONS  Our office will contact you in approximately 7 days for a progress report  MEDICATIONS  Continue to take all routine medications  Our office may have instructed you to hold some medications  As no general anesthesia was used in today's procedure, you should not experience any side effects related to anesthesia  If you have a problem specifically related to your procedure, please call our office at [phone]  Problems not related to your procedure should be directed to your primary care physician

## 2022-05-12 ENCOUNTER — OFFICE VISIT (OUTPATIENT)
Dept: PLASTIC SURGERY | Facility: CLINIC | Age: 62
End: 2022-05-12

## 2022-05-12 DIAGNOSIS — L72.0 EPIDERMAL INCLUSION CYST: ICD-10-CM

## 2022-05-12 PROCEDURE — 99024 POSTOP FOLLOW-UP VISIT: CPT | Performed by: PHYSICIAN ASSISTANT

## 2022-05-12 RX ORDER — SULFAMETHOXAZOLE AND TRIMETHOPRIM 800; 160 MG/1; MG/1
1 TABLET ORAL EVERY 12 HOURS SCHEDULED
Qty: 20 TABLET | Refills: 0 | Status: SHIPPED | OUTPATIENT
Start: 2022-05-12 | End: 2022-05-22

## 2022-05-12 NOTE — PROGRESS NOTES
POST-OP EVALUATION  5/12/2022    Subjective   Dalton Peoples is a 64 y o  male is here today for routine post-operative follow up  He is s/p excision of 3 cysts on face on 4/25/22  The patient feels as though his cysts are much improved today  Past Medical History:   Diagnosis Date    Anxiety     Arthritis     Chronic pain disorder     spine    GERD (gastroesophageal reflux disease)     Hyperlipidemia     Other cysts of jaw     Wears glasses      Past Surgical History:   Procedure Laterality Date    COLONOSCOPY      CYST REMOVAL      AR EXC SKIN BENIG >4 CM FACE,FACIAL Bilateral 4/25/2022    Procedure: EXCISION OF CYSTS ON JAWLINE  2 ON LEFT, 1 ON RIGHT;  Surgeon: Fortunato Be MD;  [facility];  Service: Plastics    AR RECMPL WND TRUNK 1 1-2 5 CM Bilateral 4/25/2022    Procedure: CLOSURE WOUND ON JAWLINE  2 ON LEFT, 1 ON RIGHT;  Surgeon: Fortunato Be MD;  [facility]: [facility];  Service: Plastics        Current Outpatient Medications:     sulfamethoxazole-trimethoprim (BACTRIM DS) 800-160 mg per tablet, Take 1 tablet by mouth every 12 (twelve) hours for 10 days, Disp: 20 tablet, Rfl: 0    acetaminophen (TYLENOL) 500 mg tablet, Take 500 mg by mouth every 6 (six) hours as needed for mild pain, Disp: , Rfl:     citalopram (CeleXA) 40 mg tablet, Take 40 mg by mouth daily  , Disp: , Rfl:     gabapentin (NEURONTIN) 300 mg capsule, Take 100 mg by mouth 3 (three) times a day, Disp: , Rfl:     methylPREDNISolone 4 MG tablet therapy pack, Use as directed on package, Disp: 21 tablet, Rfl: 0    naproxen (NAPROSYN) 500 mg tablet, Take 500 mg by mouth 2 (two) times a day with meals, Disp: , Rfl:     pantoprazole (PROTONIX) 40 mg tablet, Take 1 tablet by mouth, Disp: , Rfl:     simvastatin (ZOCOR) 20 mg tablet, Take 20 mg by mouth daily at bedtime, Disp: , Rfl:   No current facility-administered medications for this visit      Facility-Administered Medications Ordered in Other Visits:   Krista Lei bupivacaine (PF) (MARCAINE) 0 25 % injection 2 mL, 2 mL, Epidural, Once, Anastacia Cogan, MD    iohexol (OMNIPAQUE) 300 mg/mL injection 1 mL, 1 mL, Epidural, Once, Anastacia Cogan, MD    lidocaine (PF) (XYLOCAINE-MPF) 2 % injection 4 mL, 4 mL, Infiltration, Once, Anastacia Cogan, MD    methylPREDNISolone acetate (DEPO-MEDROL) injection 80 mg, 80 mg, Epidural, Once, Anastacia Cogan, MD    sodium chloride (PF) 0 9 % injection 4 mL, 4 mL, Infiltration, Once, Anastacia Cogan, MD  Allergies: Aspirin    Objective      Incisions are clean dry and intact  There is some residual raised scar tissue  There is no fluctuance  Assessment/Plan   Diagnoses and all orders for this visit:    Epidermal inclusion cyst  -     sulfamethoxazole-trimethoprim (BACTRIM DS) 800-160 mg per tablet; Take 1 tablet by mouth every 12 (twelve) hours for 10 days    Continue antibiotics for another 10 days  I recommended that he follow-up with a dermatologist for prevention and management  He will monitor the recent areas of excision and call us if any concern develops      Chrissy Gay PA-C

## 2022-10-27 ENCOUNTER — TELEPHONE (OUTPATIENT)
Dept: PAIN MEDICINE | Facility: MEDICAL CENTER | Age: 62
End: 2022-10-27

## 2022-10-27 NOTE — TELEPHONE ENCOUNTER
Caller: Patient    Doctor: Douglas Cantu    Reason for call: schedule epidural steroid injection   Call back#: [phone]

## 2022-10-27 NOTE — TELEPHONE ENCOUNTER
RN s/w pt and he said he wants to now R/S his back inj that he had to cx in the past due to surgery that he needed  He said [place] is [place] currently but [place]  He wanted to call now b/c he knows it takes a while to get in for the injection  Told pt I will make FQ aware that pt is ready to schedule his inj and he will be contacted in the near future by our   Looks like pt's 5/10 B/L L4 TFESI inj was cx'd earlier this year

## (undated) DEVICE — BANDAGE GAUZE 3 IN NS LF

## (undated) DEVICE — PAD GROUNDING ADULT

## (undated) DEVICE — SCD SEQUENTIAL COMPRESSION COMFORT SLEEVE MEDIUM KNEE LENGTH: Brand: KENDALL SCD

## (undated) DEVICE — INTENDED FOR TISSUE SEPARATION, AND OTHER PROCEDURES THAT REQUIRE A SHARP SURGICAL BLADE TO PUNCTURE OR CUT.: Brand: BARD-PARKER SAFETY BLADES SIZE 10, STERILE

## (undated) DEVICE — PLUMEPEN PRO 10FT

## (undated) DEVICE — INTENDED FOR TISSUE SEPARATION, AND OTHER PROCEDURES THAT REQUIRE A SHARP SURGICAL BLADE TO PUNCTURE OR CUT.: Brand: BARD-PARKER SAFETY BLADES SIZE 15, STERILE

## (undated) DEVICE — CYSTO TUBING SINGLE IRRIGATION

## (undated) DEVICE — BETHLEHEM UNIVERSAL OUTPATIENT: Brand: CARDINAL HEALTH

## (undated) DEVICE — GLOVE SRG BIOGEL 6.5

## (undated) DEVICE — ELECTRODE EZ CLEAN BLADE -0012

## (undated) DEVICE — CHLORAPREP HI-LITE 26ML ORANGE

## (undated) DEVICE — MEDI-VAC NON-CONDUCTIVE SUCTION TUBING 6MM X 1.8M (6FT.) L: Brand: CARDINAL HEALTH

## (undated) DEVICE — ELECTRODE BLADE MOD E-Z CLEAN  2.75IN 7CM -0012AM

## (undated) DEVICE — ELECTRODE NEEDLE MEGAFINE 2IN E-Z CLEAN MEGADYNE -0118

## (undated) DEVICE — PACK PBDS PLASTIC HEAD AND NECK RF